# Patient Record
Sex: FEMALE | Race: WHITE | Employment: FULL TIME | URBAN - METROPOLITAN AREA
[De-identification: names, ages, dates, MRNs, and addresses within clinical notes are randomized per-mention and may not be internally consistent; named-entity substitution may affect disease eponyms.]

---

## 2017-01-17 PROBLEM — M18.12 PRIMARY OSTEOARTHRITIS OF FIRST CARPOMETACARPAL JOINT OF LEFT HAND: Status: ACTIVE | Noted: 2017-01-17

## 2017-01-17 PROBLEM — M19.042 DEGENERATIVE ARTHRITIS OF FINGER, LEFT: Status: ACTIVE | Noted: 2017-01-17

## 2017-04-10 PROBLEM — M79.642 LEFT HAND PAIN: Status: ACTIVE | Noted: 2017-04-10

## 2017-05-15 PROBLEM — M72.2 PLANTAR FASCIITIS, RIGHT: Status: ACTIVE | Noted: 2017-05-15

## 2017-05-23 ENCOUNTER — LAB ENCOUNTER (OUTPATIENT)
Dept: LAB | Age: 42
End: 2017-05-23
Attending: ORTHOPAEDIC SURGERY
Payer: COMMERCIAL

## 2017-05-23 DIAGNOSIS — Z01.818 PRE-OP EXAM: Primary | ICD-10-CM

## 2017-05-23 PROCEDURE — 82947 ASSAY GLUCOSE BLOOD QUANT: CPT

## 2017-05-23 PROCEDURE — 84703 CHORIONIC GONADOTROPIN ASSAY: CPT

## 2017-06-06 PROBLEM — Z47.89 ORTHOPEDIC AFTERCARE: Status: ACTIVE | Noted: 2017-06-06

## 2017-09-19 PROBLEM — M65.4 DE QUERVAIN'S TENOSYNOVITIS, LEFT: Status: ACTIVE | Noted: 2017-09-19

## 2018-07-27 PROBLEM — M18.11 ARTHRITIS OF CARPOMETACARPAL (CMC) JOINT OF RIGHT THUMB: Status: ACTIVE | Noted: 2018-07-27

## 2020-09-30 DIAGNOSIS — M54.50 MIDLINE LOW BACK PAIN WITHOUT SCIATICA, UNSPECIFIED CHRONICITY: Primary | ICD-10-CM

## 2020-10-01 ENCOUNTER — APPOINTMENT (OUTPATIENT)
Dept: LAB | Age: 45
End: 2020-10-01
Attending: INTERNAL MEDICINE
Payer: COMMERCIAL

## 2020-10-01 DIAGNOSIS — M54.50 MIDLINE LOW BACK PAIN WITHOUT SCIATICA, UNSPECIFIED CHRONICITY: ICD-10-CM

## 2021-01-15 PROBLEM — M79.644 PAIN OF RIGHT THUMB: Status: ACTIVE | Noted: 2021-01-15

## 2021-08-17 PROBLEM — M65.331 TRIGGER MIDDLE FINGER OF RIGHT HAND: Status: ACTIVE | Noted: 2021-08-17

## 2021-08-17 PROBLEM — M65.4 DE QUERVAIN'S TENOSYNOVITIS, RIGHT: Status: ACTIVE | Noted: 2021-08-17

## 2022-01-04 ENCOUNTER — ORDER TRANSCRIPTION (OUTPATIENT)
Dept: ADMINISTRATIVE | Facility: HOSPITAL | Age: 47
End: 2022-01-04

## 2022-01-04 DIAGNOSIS — Z20.822 CLOSE EXPOSURE TO COVID-19 VIRUS: Primary | ICD-10-CM

## 2022-01-06 ENCOUNTER — LAB ENCOUNTER (OUTPATIENT)
Dept: LAB | Facility: HOSPITAL | Age: 47
End: 2022-01-06
Attending: INTERNAL MEDICINE
Payer: COMMERCIAL

## 2022-01-06 DIAGNOSIS — Z20.822 CLOSE EXPOSURE TO COVID-19 VIRUS: ICD-10-CM

## 2022-01-06 LAB — SARS-COV-2 RNA RESP QL NAA+PROBE: NOT DETECTED

## 2022-06-01 ENCOUNTER — LAB ENCOUNTER (OUTPATIENT)
Dept: LAB | Facility: HOSPITAL | Age: 47
End: 2022-06-01
Attending: HOSPITALIST
Payer: COMMERCIAL

## 2022-06-01 DIAGNOSIS — R09.82 PND (POST-NASAL DRIP): ICD-10-CM

## 2022-06-01 LAB — SARS-COV-2 RNA RESP QL NAA+PROBE: NOT DETECTED

## 2022-08-09 ENCOUNTER — APPOINTMENT (OUTPATIENT)
Dept: URBAN - METROPOLITAN AREA CLINIC 249 | Age: 47
Setting detail: DERMATOLOGY
End: 2022-08-09

## 2022-08-09 DIAGNOSIS — L82.0 INFLAMED SEBORRHEIC KERATOSIS: ICD-10-CM

## 2022-08-09 DIAGNOSIS — M71 OTHER BURSOPATHIES: ICD-10-CM

## 2022-08-09 PROBLEM — M71.341 OTHER BURSAL CYST, RIGHT HAND: Status: ACTIVE | Noted: 2022-08-09

## 2022-08-09 PROBLEM — D48.5 NEOPLASM OF UNCERTAIN BEHAVIOR OF SKIN: Status: ACTIVE | Noted: 2022-08-09

## 2022-08-09 PROCEDURE — 99212 OFFICE O/P EST SF 10 MIN: CPT | Mod: 25

## 2022-08-09 PROCEDURE — A4550 SURGICAL TRAYS: HCPCS

## 2022-08-09 PROCEDURE — 11301 SHAVE SKIN LESION 0.6-1.0 CM: CPT

## 2022-08-09 PROCEDURE — OTHER DEFER: OTHER

## 2022-08-09 PROCEDURE — OTHER SHAVE REMOVAL: OTHER

## 2022-08-09 PROCEDURE — OTHER COUNSELING: OTHER

## 2022-08-09 ASSESSMENT — LOCATION SIMPLE DESCRIPTION DERM
LOCATION SIMPLE: RIGHT INDEX FINGER
LOCATION SIMPLE: ABDOMEN

## 2022-08-09 ASSESSMENT — LOCATION ZONE DERM
LOCATION ZONE: FINGER
LOCATION ZONE: TRUNK

## 2022-08-09 ASSESSMENT — LOCATION DETAILED DESCRIPTION DERM
LOCATION DETAILED: RIGHT INDEX DISTAL INTERPHALANGEAL JOINT
LOCATION DETAILED: RIGHT RIB CAGE

## 2022-08-09 NOTE — PROCEDURE: DEFER
Other Procedure: recommended to see a hand surgeon
Introduction Text (Please End With A Colon): The following procedure was deferred:
Size Of Lesion In Cm (Optional): 0
Reason To Defer Override: referred to hand surgeon
Detail Level: Detailed

## 2022-08-09 NOTE — PROCEDURE: SHAVE REMOVAL
Medical Necessity Clause: This procedure was medically necessary because the lesion that was treated was:
Body Location Override (Optional - Billing Will Still Be Based On Selected Body Map Location If Applicable): right rib cage
Hemostasis: Electrocautery
Add Variable For Additional Medical Justification: No
Bill For Surgical Tray: yes
Biopsy Method: double edge Personna blade
Anesthesia Type: 1% lidocaine with epinephrine
Consent was obtained from the patient. The risks and benefits to therapy were discussed in detail. Specifically, the risks of infection, scarring, bleeding, prolonged wound healing, incomplete removal, allergy to anesthesia, nerve injury and recurrence were addressed. Prior to the procedure, the treatment site was clearly identified and confirmed by the patient. All components of Universal Protocol/PAUSE Rule completed.
Medical Necessity Information: It is in your best interest to select a reason for this procedure from the list below. All of these items fulfill various CMS LCD requirements except the new and changing color options.
Notification Instructions: Patient will be notified of pathology results. However, patient instructed to call the office if not contacted within 2 weeks.
Anesthesia Volume In Cc: 3
Billing Type: Third-Party Bill
Wound Care: Petrolatum
X Size Of Lesion In Cm (Optional): 0
Size Of Lesion In Cm (Required): 0.7
Post-Care Instructions: I reviewed with the patient in detail post-care instructions. Patient is to keep the biopsy site dry overnight, and then apply vaseline or petroleum twice daily until healed. Patient may wash with soap and water
Detail Level: Detailed

## 2022-09-01 ENCOUNTER — LAB REQUISITION (OUTPATIENT)
Dept: LAB | Facility: HOSPITAL | Age: 47
End: 2022-09-01
Payer: COMMERCIAL

## 2022-09-01 DIAGNOSIS — M67.422 GANGLION, LEFT ELBOW: ICD-10-CM

## 2022-09-01 PROCEDURE — 88304 TISSUE EXAM BY PATHOLOGIST: CPT | Performed by: ORTHOPAEDIC SURGERY

## 2022-09-08 ENCOUNTER — EMPLOYEE HEALTH (OUTPATIENT)
Dept: OTHER | Facility: HOSPITAL | Age: 47
End: 2022-09-08
Attending: PREVENTIVE MEDICINE

## 2022-09-08 DIAGNOSIS — Z11.1 SCREENING-PULMONARY TB: Primary | ICD-10-CM

## 2022-09-08 PROCEDURE — 86480 TB TEST CELL IMMUN MEASURE: CPT

## 2022-09-09 LAB
M TB IFN-G CD4+ T-CELLS BLD-ACNC: 0.05 IU/ML
M TB TUBERC IFN-G BLD QL: NEGATIVE
M TB TUBERC IGNF/MITOGEN IGNF CONTROL: >10 IU/ML
QFT TB1 AG MINUS NIL: 0 IU/ML
QFT TB2 AG MINUS NIL: 0.01 IU/ML

## 2022-10-31 ENCOUNTER — IMMUNIZATION (OUTPATIENT)
Dept: LAB | Facility: HOSPITAL | Age: 47
End: 2022-10-31
Attending: PREVENTIVE MEDICINE
Payer: COMMERCIAL

## 2022-10-31 DIAGNOSIS — Z23 NEED FOR VACCINATION: Primary | ICD-10-CM

## 2022-10-31 PROCEDURE — 90471 IMMUNIZATION ADMIN: CPT

## 2023-02-17 ENCOUNTER — LAB REQUISITION (OUTPATIENT)
Dept: LAB | Facility: HOSPITAL | Age: 48
End: 2023-02-17
Payer: COMMERCIAL

## 2023-02-17 DIAGNOSIS — M67.441 GANGLION, RIGHT HAND: ICD-10-CM

## 2023-02-17 PROCEDURE — 88304 TISSUE EXAM BY PATHOLOGIST: CPT | Performed by: ORTHOPAEDIC SURGERY

## 2023-02-28 ENCOUNTER — OFFICE VISIT (OUTPATIENT)
Dept: INTERNAL MEDICINE CLINIC | Facility: CLINIC | Age: 48
End: 2023-02-28
Payer: COMMERCIAL

## 2023-02-28 VITALS
WEIGHT: 202 LBS | TEMPERATURE: 98 F | RESPIRATION RATE: 16 BRPM | BODY MASS INDEX: 39.66 KG/M2 | HEART RATE: 68 BPM | DIASTOLIC BLOOD PRESSURE: 84 MMHG | SYSTOLIC BLOOD PRESSURE: 126 MMHG | HEIGHT: 60 IN | OXYGEN SATURATION: 98 %

## 2023-02-28 DIAGNOSIS — Z12.4 CERVICAL CANCER SCREENING: ICD-10-CM

## 2023-02-28 DIAGNOSIS — Z12.11 COLON CANCER SCREENING: ICD-10-CM

## 2023-02-28 DIAGNOSIS — E11.9 TYPE 2 DIABETES MELLITUS WITHOUT COMPLICATION, WITHOUT LONG-TERM CURRENT USE OF INSULIN (HCC): ICD-10-CM

## 2023-02-28 DIAGNOSIS — Z12.31 ENCOUNTER FOR SCREENING MAMMOGRAM FOR MALIGNANT NEOPLASM OF BREAST: ICD-10-CM

## 2023-02-28 DIAGNOSIS — Z23 NEED FOR PNEUMOCOCCAL VACCINATION: ICD-10-CM

## 2023-02-28 DIAGNOSIS — Z00.00 ANNUAL PHYSICAL EXAM: Primary | ICD-10-CM

## 2023-02-28 DIAGNOSIS — Z00.00 ROUTINE GENERAL MEDICAL EXAMINATION AT A HEALTH CARE FACILITY: ICD-10-CM

## 2023-02-28 PROBLEM — M65.4 DE QUERVAIN'S TENOSYNOVITIS, RIGHT: Status: RESOLVED | Noted: 2021-08-17 | Resolved: 2023-02-28

## 2023-02-28 PROBLEM — M19.042 DEGENERATIVE ARTHRITIS OF FINGER, LEFT: Status: RESOLVED | Noted: 2017-01-17 | Resolved: 2023-02-28

## 2023-02-28 PROBLEM — M79.644 PAIN OF RIGHT THUMB: Status: RESOLVED | Noted: 2021-01-15 | Resolved: 2023-02-28

## 2023-02-28 PROBLEM — M65.4 DE QUERVAIN'S TENOSYNOVITIS, LEFT: Status: RESOLVED | Noted: 2017-09-19 | Resolved: 2023-02-28

## 2023-02-28 PROBLEM — M72.2 PLANTAR FASCIITIS, RIGHT: Status: RESOLVED | Noted: 2017-05-15 | Resolved: 2023-02-28

## 2023-02-28 PROBLEM — E11.69 HYPERLIPIDEMIA ASSOCIATED WITH TYPE 2 DIABETES MELLITUS  (HCC): Status: ACTIVE | Noted: 2023-02-28

## 2023-02-28 PROBLEM — E11.69 HYPERLIPIDEMIA ASSOCIATED WITH TYPE 2 DIABETES MELLITUS (HCC): Status: ACTIVE | Noted: 2023-02-28

## 2023-02-28 PROBLEM — M65.331 TRIGGER MIDDLE FINGER OF RIGHT HAND: Status: RESOLVED | Noted: 2021-08-17 | Resolved: 2023-02-28

## 2023-02-28 PROBLEM — M79.642 LEFT HAND PAIN: Status: RESOLVED | Noted: 2017-04-10 | Resolved: 2023-02-28

## 2023-02-28 PROBLEM — M18.12 PRIMARY OSTEOARTHRITIS OF FIRST CARPOMETACARPAL JOINT OF LEFT HAND: Status: RESOLVED | Noted: 2017-01-17 | Resolved: 2023-02-28

## 2023-02-28 PROBLEM — E78.5 HYPERLIPIDEMIA ASSOCIATED WITH TYPE 2 DIABETES MELLITUS (HCC): Status: ACTIVE | Noted: 2023-02-28

## 2023-02-28 PROBLEM — M18.11 ARTHRITIS OF CARPOMETACARPAL (CMC) JOINT OF RIGHT THUMB: Status: RESOLVED | Noted: 2018-07-27 | Resolved: 2023-02-28

## 2023-02-28 PROBLEM — Z47.89 ORTHOPEDIC AFTERCARE: Status: RESOLVED | Noted: 2017-06-06 | Resolved: 2023-02-28

## 2023-02-28 PROBLEM — E78.5 HYPERLIPIDEMIA ASSOCIATED WITH TYPE 2 DIABETES MELLITUS: Status: ACTIVE | Noted: 2023-02-28

## 2023-02-28 PROBLEM — E66.01 MORBID (SEVERE) OBESITY DUE TO EXCESS CALORIES (HCC): Status: ACTIVE | Noted: 2023-02-28

## 2023-02-28 PROBLEM — E78.5 HYPERLIPIDEMIA ASSOCIATED WITH TYPE 2 DIABETES MELLITUS  (HCC): Status: ACTIVE | Noted: 2023-02-28

## 2023-02-28 PROBLEM — E11.69 HYPERLIPIDEMIA ASSOCIATED WITH TYPE 2 DIABETES MELLITUS: Status: ACTIVE | Noted: 2023-02-28

## 2023-02-28 PROCEDURE — 3074F SYST BP LT 130 MM HG: CPT | Performed by: INTERNAL MEDICINE

## 2023-02-28 PROCEDURE — 90677 PCV20 VACCINE IM: CPT | Performed by: INTERNAL MEDICINE

## 2023-02-28 PROCEDURE — 3008F BODY MASS INDEX DOCD: CPT | Performed by: INTERNAL MEDICINE

## 2023-02-28 PROCEDURE — 3079F DIAST BP 80-89 MM HG: CPT | Performed by: INTERNAL MEDICINE

## 2023-02-28 PROCEDURE — 99386 PREV VISIT NEW AGE 40-64: CPT | Performed by: INTERNAL MEDICINE

## 2023-02-28 PROCEDURE — 90471 IMMUNIZATION ADMIN: CPT | Performed by: INTERNAL MEDICINE

## 2023-02-28 RX ORDER — EXENATIDE 2 MG/.85ML
INJECTION, SUSPENSION, EXTENDED RELEASE SUBCUTANEOUS
COMMUNITY
Start: 2022-11-23

## 2023-02-28 RX ORDER — LANCETS 33 GAUGE
1 EACH MISCELLANEOUS AS DIRECTED
COMMUNITY

## 2023-02-28 RX ORDER — ROSUVASTATIN CALCIUM 20 MG/1
20 TABLET, COATED ORAL DAILY
Qty: 90 TABLET | Refills: 3 | Status: SHIPPED | OUTPATIENT
Start: 2023-02-28

## 2023-02-28 RX ORDER — ROSUVASTATIN CALCIUM 20 MG/1
20 TABLET, COATED ORAL DAILY
Qty: 90 TABLET | Refills: 3 | Status: SHIPPED | OUTPATIENT
Start: 2023-02-28 | End: 2023-02-28

## 2023-02-28 RX ORDER — BLOOD SUGAR DIAGNOSTIC
STRIP MISCELLANEOUS
COMMUNITY

## 2023-02-28 RX ORDER — EMPAGLIFLOZIN 25 MG/1
TABLET, FILM COATED ORAL
COMMUNITY
Start: 2023-01-27

## 2023-03-13 NOTE — TELEPHONE ENCOUNTER
Last time medication was refilled 2/11/21  Quantity and # of refills 135/1  Last OV 2828/23  Next OV 8/22/23

## 2023-03-13 NOTE — TELEPHONE ENCOUNTER
Refill Req:   Patient has 1 week left of     Sertraline HCl 50 MG Oral Tab    Please send to [facility]     Last office visit 2/28/23    Next visit 8/22/23

## 2023-03-15 ENCOUNTER — LAB ENCOUNTER (OUTPATIENT)
Dept: LAB | Age: 48
End: 2023-03-15
Attending: PREVENTIVE MEDICINE
Payer: COMMERCIAL

## 2023-05-01 ENCOUNTER — HOSPITAL ENCOUNTER (OUTPATIENT)
Dept: MAMMOGRAPHY | Facility: HOSPITAL | Age: 48
Discharge: HOME | End: 2023-05-01
Attending: INTERNAL MEDICINE
Payer: COMMERCIAL

## 2023-05-01 ENCOUNTER — OFFICE VISIT (OUTPATIENT)
Facility: CLINIC | Age: 48
End: 2023-05-01
Payer: COMMERCIAL

## 2023-05-01 VITALS
HEART RATE: 90 BPM | SYSTOLIC BLOOD PRESSURE: 126 MMHG | HEIGHT: 60 IN | BODY MASS INDEX: 38.21 KG/M2 | DIASTOLIC BLOOD PRESSURE: 74 MMHG | WEIGHT: 194.63 LBS

## 2023-05-01 DIAGNOSIS — Z12.31 ENCOUNTER FOR SCREENING MAMMOGRAM FOR MALIGNANT NEOPLASM OF BREAST: ICD-10-CM

## 2023-05-01 DIAGNOSIS — Z01.419 ENCOUNTER FOR ANNUAL ROUTINE GYNECOLOGICAL EXAMINATION: Primary | ICD-10-CM

## 2023-05-01 PROCEDURE — 87624 HPV HI-RISK TYP POOLED RSLT: CPT | Performed by: STUDENT IN AN ORGANIZED HEALTH CARE EDUCATION/TRAINING PROGRAM

## 2023-05-01 PROCEDURE — 77063 BREAST TOMOSYNTHESIS BI: CPT | Performed by: INTERNAL MEDICINE

## 2023-05-01 PROCEDURE — 77067 SCR MAMMO BI INCL CAD: CPT | Performed by: INTERNAL MEDICINE

## 2023-05-02 LAB — HPV I/H RISK 1 DNA SPEC QL NAA+PROBE: NEGATIVE

## 2023-05-17 ENCOUNTER — LAB ENCOUNTER (OUTPATIENT)
Dept: LAB | Age: 48
End: 2023-05-17
Attending: INTERNAL MEDICINE
Payer: COMMERCIAL

## 2023-05-17 PROCEDURE — 3046F HEMOGLOBIN A1C LEVEL >9.0%: CPT | Performed by: INTERNAL MEDICINE

## 2023-07-05 ENCOUNTER — OFFICE VISIT (OUTPATIENT)
Facility: CLINIC | Age: 48
End: 2023-07-05
Payer: COMMERCIAL

## 2023-07-05 DIAGNOSIS — E78.5 HYPERLIPIDEMIA ASSOCIATED WITH TYPE 2 DIABETES MELLITUS: Primary | ICD-10-CM

## 2023-07-05 DIAGNOSIS — E11.9 TYPE 2 DIABETES MELLITUS WITHOUT COMPLICATION, WITHOUT LONG-TERM CURRENT USE OF INSULIN (HCC): ICD-10-CM

## 2023-07-05 DIAGNOSIS — E66.01 MORBID (SEVERE) OBESITY DUE TO EXCESS CALORIES (HCC): ICD-10-CM

## 2023-07-05 DIAGNOSIS — G47.33 OBSTRUCTIVE SLEEP APNEA: ICD-10-CM

## 2023-07-05 DIAGNOSIS — E11.69 HYPERLIPIDEMIA ASSOCIATED WITH TYPE 2 DIABETES MELLITUS: Primary | ICD-10-CM

## 2023-07-05 PROCEDURE — 99203 OFFICE O/P NEW LOW 30 MIN: CPT | Performed by: OTHER

## 2023-07-10 ENCOUNTER — TELEPHONE (OUTPATIENT)
Facility: CLINIC | Age: 48
End: 2023-07-10

## 2023-07-10 DIAGNOSIS — E11.9 TYPE 2 DIABETES MELLITUS WITHOUT COMPLICATION, WITHOUT LONG-TERM CURRENT USE OF INSULIN (HCC): ICD-10-CM

## 2023-07-10 DIAGNOSIS — G47.33 OBSTRUCTIVE SLEEP APNEA: Primary | ICD-10-CM

## 2023-07-10 NOTE — TELEPHONE ENCOUNTER
Per Dr. Jonas Crane, please order apap 6-12, needs new supplies. Pt notified cpap machine ordered to [address] [place] [address]. DME will verify insurance and once approved will contact pt to arrange delivery and instructions. Pt instructed to follow up with Dr. Jonas Crane once pt starts PAP therapy per insurance compliance requirement. Pt verbalized understanding of instructions and agrees with the plan.      [phone] (home) [phone] (work)  [address] [place] [address] - [phone]

## 2023-07-21 ENCOUNTER — OFFICE VISIT (OUTPATIENT)
Dept: SLEEP CENTER | Age: 48
End: 2023-07-21
Attending: Other
Payer: COMMERCIAL

## 2023-07-21 DIAGNOSIS — G47.33 OBSTRUCTIVE SLEEP APNEA: ICD-10-CM

## 2023-07-21 DIAGNOSIS — E11.9 TYPE 2 DIABETES MELLITUS WITHOUT COMPLICATION, WITHOUT LONG-TERM CURRENT USE OF INSULIN (HCC): ICD-10-CM

## 2023-07-21 PROCEDURE — 95810 POLYSOM 6/> YRS 4/> PARAM: CPT

## 2023-07-21 PROCEDURE — 95811 POLYSOM 6/>YRS CPAP 4/> PARM: CPT

## 2023-07-24 PROBLEM — Z12.11 SPECIAL SCREENING FOR MALIGNANT NEOPLASM OF COLON: Status: ACTIVE | Noted: 2023-07-24

## 2023-07-24 PROBLEM — D12.8 BENIGN NEOPLASM OF RECTUM AND ANAL CANAL: Status: ACTIVE | Noted: 2023-07-24

## 2023-07-24 PROBLEM — D12.9 BENIGN NEOPLASM OF RECTUM AND ANAL CANAL: Status: ACTIVE | Noted: 2023-07-24

## 2023-07-24 PROBLEM — D12.3 BENIGN NEOPLASM OF TRANSVERSE COLON: Status: ACTIVE | Noted: 2023-07-24

## 2023-07-27 ENCOUNTER — SLEEP STUDY (OUTPATIENT)
Facility: CLINIC | Age: 48
End: 2023-07-27
Payer: COMMERCIAL

## 2023-07-27 DIAGNOSIS — G47.9 SLEEP DISORDER: Primary | ICD-10-CM

## 2023-07-27 DIAGNOSIS — G47.33 OBSTRUCTIVE SLEEP APNEA SYNDROME: ICD-10-CM

## 2023-07-27 PROCEDURE — 95811 POLYSOM 6/>YRS CPAP 4/> PARM: CPT | Performed by: OTHER

## 2023-07-31 ENCOUNTER — TELEPHONE (OUTPATIENT)
Facility: CLINIC | Age: 48
End: 2023-07-31

## 2023-07-31 DIAGNOSIS — G47.33 OSA (OBSTRUCTIVE SLEEP APNEA): Primary | ICD-10-CM

## 2023-08-01 ENCOUNTER — MED REC SCAN ONLY (OUTPATIENT)
Facility: CLINIC | Age: 48
End: 2023-08-01

## 2023-08-21 ENCOUNTER — LAB ENCOUNTER (OUTPATIENT)
Dept: LAB | Age: 48
End: 2023-08-21
Attending: INTERNAL MEDICINE
Payer: COMMERCIAL

## 2023-08-21 ENCOUNTER — PATIENT MESSAGE (OUTPATIENT)
Dept: INTERNAL MEDICINE CLINIC | Facility: CLINIC | Age: 48
End: 2023-08-21

## 2023-08-21 DIAGNOSIS — Z00.00 ROUTINE GENERAL MEDICAL EXAMINATION AT A HEALTH CARE FACILITY: ICD-10-CM

## 2023-08-21 LAB
BILIRUB UR QL STRIP.AUTO: NEGATIVE
CLARITY UR REFRACT.AUTO: CLEAR
COLOR UR AUTO: COLORLESS
CREAT UR-SCNC: 64.5 MG/DL
GLUCOSE UR STRIP.AUTO-MCNC: >1000 MG/DL
KETONES UR STRIP.AUTO-MCNC: NEGATIVE MG/DL
LEUKOCYTE ESTERASE UR QL STRIP.AUTO: NEGATIVE
MICROALBUMIN UR-MCNC: 1.55 MG/DL
MICROALBUMIN/CREAT 24H UR-RTO: 24 UG/MG (ref ?–30)
NITRITE UR QL STRIP.AUTO: NEGATIVE
PH UR STRIP.AUTO: 5 [PH] (ref 5–8)
PROT UR STRIP.AUTO-MCNC: NEGATIVE MG/DL
RBC UR QL AUTO: NEGATIVE
SP GR UR STRIP.AUTO: 1.02 (ref 1–1.03)
UROBILINOGEN UR STRIP.AUTO-MCNC: NORMAL MG/DL

## 2023-08-21 PROCEDURE — 82570 ASSAY OF URINE CREATININE: CPT

## 2023-08-21 PROCEDURE — 81003 URINALYSIS AUTO W/O SCOPE: CPT

## 2023-08-21 PROCEDURE — 82043 UR ALBUMIN QUANTITATIVE: CPT

## 2023-08-21 PROCEDURE — 3061F NEG MICROALBUMINURIA REV: CPT | Performed by: INTERNAL MEDICINE

## 2023-08-21 NOTE — TELEPHONE ENCOUNTER
From: [facility]  To: Bharathi Wetzel MD  Sent: 8/21/2023 9:48 AM CDT  Subject: Labs    Is there any other labs besides the urinalysis that i need to have done before my visit tomorrow? Thank you!

## 2023-08-22 ENCOUNTER — OFFICE VISIT (OUTPATIENT)
Dept: INTERNAL MEDICINE CLINIC | Facility: CLINIC | Age: 48
End: 2023-08-22
Payer: COMMERCIAL

## 2023-08-22 VITALS
WEIGHT: 197 LBS | OXYGEN SATURATION: 98 % | BODY MASS INDEX: 38 KG/M2 | TEMPERATURE: 98 F | SYSTOLIC BLOOD PRESSURE: 102 MMHG | DIASTOLIC BLOOD PRESSURE: 70 MMHG | RESPIRATION RATE: 16 BRPM | HEART RATE: 83 BPM

## 2023-08-22 DIAGNOSIS — E78.2 MIXED HYPERLIPIDEMIA: ICD-10-CM

## 2023-08-22 DIAGNOSIS — E11.9 TYPE 2 DIABETES MELLITUS WITHOUT COMPLICATION, WITHOUT LONG-TERM CURRENT USE OF INSULIN (HCC): Primary | ICD-10-CM

## 2023-08-22 PROBLEM — Z12.11 SPECIAL SCREENING FOR MALIGNANT NEOPLASM OF COLON: Status: RESOLVED | Noted: 2023-07-24 | Resolved: 2023-08-22

## 2023-08-22 PROBLEM — D12.9 BENIGN NEOPLASM OF RECTUM AND ANAL CANAL: Status: RESOLVED | Noted: 2023-07-24 | Resolved: 2023-08-22

## 2023-08-22 PROBLEM — D12.8 BENIGN NEOPLASM OF RECTUM AND ANAL CANAL: Status: RESOLVED | Noted: 2023-07-24 | Resolved: 2023-08-22

## 2023-08-22 PROBLEM — D12.3 BENIGN NEOPLASM OF TRANSVERSE COLON: Status: RESOLVED | Noted: 2023-07-24 | Resolved: 2023-08-22

## 2023-08-22 PROCEDURE — 99214 OFFICE O/P EST MOD 30 MIN: CPT | Performed by: INTERNAL MEDICINE

## 2023-08-22 PROCEDURE — 3078F DIAST BP <80 MM HG: CPT | Performed by: INTERNAL MEDICINE

## 2023-08-22 PROCEDURE — 3074F SYST BP LT 130 MM HG: CPT | Performed by: INTERNAL MEDICINE

## 2023-08-22 RX ORDER — BLOOD-GLUCOSE METER
1 KIT MISCELLANEOUS AS DIRECTED
COMMUNITY
Start: 2023-06-14

## 2023-08-22 RX ORDER — PEN NEEDLE, DIABETIC 32GX 5/32"
NEEDLE, DISPOSABLE MISCELLANEOUS
COMMUNITY
Start: 2023-05-30

## 2023-08-22 RX ORDER — INSULIN GLARGINE AND LIXISENATIDE 100; 33 U/ML; UG/ML
INJECTION, SOLUTION SUBCUTANEOUS
COMMUNITY

## 2023-08-22 RX ORDER — LISINOPRIL 5 MG/1
5 TABLET ORAL DAILY
Qty: 90 TABLET | Refills: 3 | Status: SHIPPED | OUTPATIENT
Start: 2023-08-22

## 2023-10-19 NOTE — TELEPHONE ENCOUNTER
Last time medication was refilled 03/13/2023  Quantity and # of refills 135 w/ 1  Last OV 08/22/2023  Next OV 02/29/2024    Medication not on protocol.        Sent to Dr. Matthew Morton for approval.

## 2023-12-06 ENCOUNTER — LAB ENCOUNTER (OUTPATIENT)
Dept: LAB | Age: 48
End: 2023-12-06
Attending: PREVENTIVE MEDICINE
Payer: COMMERCIAL

## 2023-12-07 ENCOUNTER — TELEPHONE (OUTPATIENT)
Dept: ORTHOPEDICS CLINIC | Facility: CLINIC | Age: 48
End: 2023-12-07

## 2023-12-07 DIAGNOSIS — Z01.89 ENCOUNTER FOR LOWER EXTREMITY COMPARISON IMAGING STUDY: ICD-10-CM

## 2023-12-07 DIAGNOSIS — M25.561 RIGHT KNEE PAIN, UNSPECIFIED CHRONICITY: Primary | ICD-10-CM

## 2023-12-07 NOTE — TELEPHONE ENCOUNTER
An X-ray has been ordered and scheduled in accordance with the provider's protocol for the patient's upcoming appointment.

## 2023-12-13 ENCOUNTER — HOSPITAL ENCOUNTER (OUTPATIENT)
Dept: GENERAL RADIOLOGY | Age: 48
Discharge: HOME | End: 2023-12-13
Attending: ORTHOPAEDIC SURGERY
Payer: COMMERCIAL

## 2023-12-13 ENCOUNTER — OFFICE VISIT (OUTPATIENT)
Dept: ORTHOPEDICS CLINIC | Facility: CLINIC | Age: 48
End: 2023-12-13
Payer: COMMERCIAL

## 2023-12-13 VITALS — WEIGHT: 189 LBS | HEIGHT: 60 IN | BODY MASS INDEX: 37.11 KG/M2

## 2023-12-13 DIAGNOSIS — M17.11 PRIMARY OSTEOARTHRITIS OF RIGHT KNEE: Primary | ICD-10-CM

## 2023-12-13 DIAGNOSIS — Z01.89 ENCOUNTER FOR LOWER EXTREMITY COMPARISON IMAGING STUDY: ICD-10-CM

## 2023-12-13 DIAGNOSIS — M25.561 RIGHT KNEE PAIN, UNSPECIFIED CHRONICITY: ICD-10-CM

## 2023-12-13 PROCEDURE — 73562 X-RAY EXAM OF KNEE 3: CPT | Performed by: ORTHOPAEDIC SURGERY

## 2023-12-13 PROCEDURE — 73564 X-RAY EXAM KNEE 4 OR MORE: CPT | Performed by: ORTHOPAEDIC SURGERY

## 2023-12-13 RX ORDER — TRIAMCINOLONE ACETONIDE 40 MG/ML
40 INJECTION, SUSPENSION INTRA-ARTICULAR; INTRAMUSCULAR ONCE
Status: COMPLETED | OUTPATIENT
Start: 2023-12-13 | End: 2023-12-13

## 2023-12-13 RX ORDER — KETOROLAC TROMETHAMINE 30 MG/ML
30 INJECTION, SOLUTION INTRAMUSCULAR; INTRAVENOUS ONCE
Status: COMPLETED | OUTPATIENT
Start: 2023-12-13 | End: 2023-12-13

## 2023-12-13 RX ADMIN — TRIAMCINOLONE ACETONIDE 40 MG: 40 INJECTION, SUSPENSION INTRA-ARTICULAR; INTRAMUSCULAR at 09:00:00

## 2023-12-13 RX ADMIN — KETOROLAC TROMETHAMINE 30 MG: 30 INJECTION, SOLUTION INTRAMUSCULAR; INTRAVENOUS at 09:00:00

## 2023-12-13 NOTE — PROCEDURES
Right Knee Intra-articular Injection    Name: Wiley Zaman   MRN: [id number]  Date: 12/13/2023     Clinical Indications:   Knee Osteoarthritis with symptoms refractory to conservative measures. After informed consent, the injection site was marked, sterilized with topical chlorhexidine antiseptic, and locally anesthetized with skin refrigerant. The patient was situation in a comfortable position. Using sterile technique: 1 mL of 30mg/mL of Ketorolac, 2 mL of 0.5% Bupivicaine, 2 mL of 1% Lidocaine, and 1 mL of 40 mg/ml Triamcinolone was injected utilizing anterolateral approach with a 22 gauge needle. A band-aid was applied. The patient tolerated the procedure well. Pallavi Stone. Popeye Benedict MD  Knee, Shoulder, & Elbow Surgery / Sports Medicine Specialist  Orthopaedic Surgery  [address]. [email or web address]  t: [phone]  o: [phone]  f: [phone]

## 2024-01-03 ENCOUNTER — LAB ENCOUNTER (OUTPATIENT)
Dept: LAB | Age: 49
End: 2024-01-03
Attending: PREVENTIVE MEDICINE
Payer: COMMERCIAL

## 2024-01-05 ENCOUNTER — PATIENT MESSAGE (OUTPATIENT)
Dept: ORTHOPEDICS CLINIC | Facility: CLINIC | Age: 49
End: 2024-01-05

## 2024-01-05 NOTE — TELEPHONE ENCOUNTER
From: Merna Caceres  To: Shital Caballero  Sent: 1/5/2024 10:34 AM CST  Subject: Referral    Hi Dr. Caballero,    Can you recommend a orthopedic doctor that deals with hands? I currently see Dr Anne out of [place] but he is out of network now. I have been diagnosed with Dupuytren's contracture. I have developed another cyst that i need looked at. Thanks for your help

## 2024-01-25 RX ORDER — LISINOPRIL 5 MG/1
5 TABLET ORAL DAILY
Qty: 90 TABLET | Refills: 0 | Status: SHIPPED | OUTPATIENT
Start: 2024-01-25

## 2024-01-25 RX ORDER — LISINOPRIL 5 MG/1
5 TABLET ORAL DAILY
Qty: 90 TABLET | Refills: 0 | Status: SHIPPED | OUTPATIENT
Start: 2024-01-25 | End: 2024-01-25

## 2024-01-25 NOTE — TELEPHONE ENCOUNTER
Patient comment: My endocrinologist has me on this but i have left several messages for a refill with no response.  Please call in to the [facility] [phone] qty [phone]. Thank you     Last time medication was refilled 08/22/2023  Quantity and # of refills 90 w 3  Last OV 08/22/2023  Next OV   Future Appointments   Date Time Provider Department Center   2/14/2024  1:15 PM Nba Campbell MD [facility]   2/29/2024  4:45 PM River Gandara MD [facility] [address] [address]     Failed protocol.

## 2024-02-14 ENCOUNTER — OFFICE VISIT (OUTPATIENT)
Dept: ORTHOPEDICS CLINIC | Facility: CLINIC | Age: 49
End: 2024-02-14
Payer: COMMERCIAL

## 2024-02-14 VITALS — WEIGHT: 189 LBS | BODY MASS INDEX: 37.11 KG/M2 | HEIGHT: 60 IN

## 2024-02-14 DIAGNOSIS — M65.311 TRIGGER FINGER OF RIGHT THUMB: Primary | ICD-10-CM

## 2024-02-14 PROCEDURE — 20550 NJX 1 TENDON SHEATH/LIGAMENT: CPT | Performed by: ORTHOPAEDIC SURGERY

## 2024-02-14 PROCEDURE — 99213 OFFICE O/P EST LOW 20 MIN: CPT | Performed by: ORTHOPAEDIC SURGERY

## 2024-02-14 RX ORDER — BETAMETHASONE SODIUM PHOSPHATE AND BETAMETHASONE ACETATE 3; 3 MG/ML; MG/ML
6 INJECTION, SUSPENSION INTRA-ARTICULAR; INTRALESIONAL; INTRAMUSCULAR; SOFT TISSUE ONCE
Status: COMPLETED | OUTPATIENT
Start: 2024-02-14 | End: 2024-02-14

## 2024-02-14 RX ADMIN — BETAMETHASONE SODIUM PHOSPHATE AND BETAMETHASONE ACETATE 6 MG: 3; 3 INJECTION, SUSPENSION INTRA-ARTICULAR; INTRALESIONAL; INTRAMUSCULAR; SOFT TISSUE at 14:06:00

## 2024-02-14 NOTE — H&P
Clinic Note     Assessment/Plan:  48 year old female    Right index finger DIP joint cyst - Patient is minimally symptomatic. The etiology of the patient's condition was discussed including its benign nature.  Nonsurgical and surgical treatment options were reviewed with the patient.  Patient elected to proceed with nonsurgical management and observe the cyst. If the cyst grows or becomes painful, we may consider a different surgical procedure than what was done previously and ideally this might prevent it from recurring again.  Right trigger thumb - Actively triggering. We discussed nonsurgical treatment options and elected to proceed with a corticosteroid injection which was performed today.  If triggering does not fully resolved by 6-8 weeks we can consider other options such as an A1 pulley release..Patient is in agreement with plan.      Follow Up: 6-8 weeks    Injection:     Written consent was obtained.  The skin was prepped with alcohol.  Ethyl chloride spray was used anesthetize the superficial skin.  A 25-gauge needle was used to inject 1.5 mL mixture of 1 mL of 6 mg of betamethasone and 1 mL of 1% lidocaine into right thumb.  Hemostasis achieved.  Band-Aid was applied.  Patient tolerated procedure without complication.        Diagnostic Studies:     None       Physical Exam:     Ht 5' (1.524 m)   Wt 189 lb (85.7 kg)   LMP 08/20/2023   BMI 36.91 kg/m²     Constitutional: NAD. AOx3. Well-developed and Well-nourished.   Psychiatric: Normal mood/ affect/ behavior. Judgment and thought content normal.     Right Upper Extremity:     Inspection    Skin intact. No skin lesions. No obvious mass visualized.    Palpation    TTP over A1 pulley.   Active triggering      ROM    Full finger and wrist motion     Neurovascular    Normal sensation in the median, ulnar, and radial nerve distribution. Normal motor function of muscles innervated by median/AIN, ulnar, and radial/PIN nerves.    Normally perfused hand(s).      Special    (+) Active triggering            CC: Right index finger cyst    HPI: This 48 year old RHD female presents with a right index finger cyst.  Started about a year ago.  Patient previously has had 2 removed.  Mild pain. Patient also reports history of dupuytren's.       Occupation: PSR    History/Other:   Past Medical History:   Diagnosis Date    Anxiety state, unspecified     Arthritis 2017    Decorative tattoo 1994    Depressive disorder, not elsewhere classified     Diabetes mellitus (HCC) 1997    Obesity, unspecified     Other and unspecified hyperlipidemia     Pap smear for cervical cancer screening 10/17/2019    Negative/HPV Negative    Type II or unspecified type diabetes mellitus without mention of complication, not stated as uncontrolled     seeing Endo    Unspecified essential hypertension     Unspecified sleep apnea     Wears glasses 1975     Past Surgical History:   Procedure Laterality Date    HAND/FINGER SURGERY UNLISTED Left 3/17/17--Dr. Anne    Thumb MP fusion and ligament reconstruction tendon interposition arthroplasty    HAND/FINGER SURGERY UNLISTED Right 12/30/20--Dr. Adair Anne    thumb ligament reconstruction tendon interposition arthroplasty    KNEE SURGERY  02/20/2015     Current Outpatient Medications   Medication Sig Dispense Refill    lisinopril 5 MG Oral Tab Take 1 tablet (5 mg total) by mouth daily. 90 tablet 0    sertraline 50 MG Oral Tab TAKE ONE AND ONE-HALF TABLETS ONCE DAILY 135 tablet 0    Insulin Glargine-Lixisenatide (SOLIQUA) 100-33 UNT-MCG/ML Subcutaneous Solution Pen-injector Inject into the skin.      BD PEN NEEDLE ENRIQUE U/F 32G X 4 MM Does not apply Misc       Blood Glucose Monitoring Suppl (CONTOUR NEXT GEN MONITOR) w/Device Does not apply Kit Take 1 Bottle by mouth As Directed.      Desonide 0.05 % External Ointment Apply 1 g topically daily. 15 g 1    Glucose Blood (ONETOUCH VERIO) In Vitro Strip OneTouch Verio test strips      OneTouch Delica Lancets 33G  Does not apply Misc 1 Lancet by Finger stick route As Directed.      JARDIANCE 25 MG Oral Tab       rosuvastatin (CRESTOR) 20 MG Oral Tab Take 1 tablet (20 mg total) by mouth daily. 90 tablet 3    hydrocortisone 2.5 % External Cream Apply 1 Application. topically 2 (two) times daily. Apply to face twice daily 30 g 2    ALPRAZolam 0.25 MG Oral Tab Take 1 tablet (0.25 mg total) by mouth nightly as needed for Sleep. 90 tablet 0    MetFORMIN HCl (GLUCOPHAGE) 1000 MG Oral Tab        No Known Allergies  Family History   Problem Relation Age of Onset    Prostate Cancer Father     Heart Disorder Father         MI, stroke    Diabetes Father         unknown    Cancer Father         prostate    Diabetes Mother         unknown    Hypertension Mother         unknown    Lipids Mother         unknown    Cancer Maternal Grandfather         stomach    Diabetes Sister     Diabetes Brother      Social History     Occupational History    Not on file   Tobacco Use    Smoking status: Former     Packs/day: .5     Types: Cigarettes     Quit date: 12/29/2020     Years since quitting: 3.1    Smokeless tobacco: Former    Tobacco comments:     4 cigaretts per day for 10 yrs   Vaping Use    Vaping Use: Never used   Substance and Sexual Activity    Alcohol use: Not Currently    Drug use: No    Sexual activity: Not Currently      Assessment       Review of Systems (negative unless bolded):  General: fevers, chills, fatigue  CV:  chest pain, palpitations, leg swelling  Msk: bodyaches, neck pain, neck stiffness  Skin: rashes, open wounds, nonhealing ulcers  Hem: bleeds easily, bruise easily, immunocompromised  Neuro: dizziness, light headedness, headaches  Psych: anxious, depressed, anger issues    Attention: This note has been scribed by Libra Gallegos under the supervision of Nba Campbell MD.     Nba Campbell MD   Hand, Wrist, & Elbow Surgery  [email or web address]  t: [phone]  f: [phone]

## 2024-03-07 ENCOUNTER — OFFICE VISIT (OUTPATIENT)
Dept: INTERNAL MEDICINE CLINIC | Facility: CLINIC | Age: 49
End: 2024-03-07
Payer: COMMERCIAL

## 2024-03-07 VITALS
BODY MASS INDEX: 38.09 KG/M2 | HEIGHT: 60 IN | OXYGEN SATURATION: 98 % | DIASTOLIC BLOOD PRESSURE: 70 MMHG | SYSTOLIC BLOOD PRESSURE: 128 MMHG | TEMPERATURE: 98 F | RESPIRATION RATE: 14 BRPM | HEART RATE: 80 BPM | WEIGHT: 194 LBS

## 2024-03-07 DIAGNOSIS — E11.9 TYPE 2 DIABETES MELLITUS WITHOUT COMPLICATION, WITHOUT LONG-TERM CURRENT USE OF INSULIN (HCC): ICD-10-CM

## 2024-03-07 DIAGNOSIS — Z00.00 ROUTINE GENERAL MEDICAL EXAMINATION AT A HEALTH CARE FACILITY: ICD-10-CM

## 2024-03-07 DIAGNOSIS — Z12.31 ENCOUNTER FOR SCREENING MAMMOGRAM FOR MALIGNANT NEOPLASM OF BREAST: ICD-10-CM

## 2024-03-07 DIAGNOSIS — Z00.00 ANNUAL PHYSICAL EXAM: Primary | ICD-10-CM

## 2024-03-07 PROCEDURE — 99396 PREV VISIT EST AGE 40-64: CPT | Performed by: INTERNAL MEDICINE

## 2024-03-07 NOTE — PROGRESS NOTES
Subjective:   Patient ID: Merna Caceres is a 48 year old female.    Diabetes      HPI:   Merna Caceres is a 48 year old female who presents for a complete physical exam. Symptoms: denies discharge, itching, burning or dysuria. Patient complains of nothing  Greatly improved accu checks  Denies cp or sob.     PAST MEDICAL, SOCIAL, FAMILY HISTORIES REVIEWED WITH PT    Immunization History   Administered Date(s) Administered    >=3 YRS TRI  MULTIDOSE VIAL (84777) FLU CLINIC 10/09/2023    Covid-19 Vaccine Pfizer 30 mcg/0.3 ml 02/04/2021, 02/26/2021, 12/31/2021    FLU VAC QIV SPLIT 3 YRS AND OLDER (36188) 10/31/2017    FLULAVAL 6 months & older 0.5 ml Prefilled syringe (40970) 10/31/2022    FLUZONE 6 months and older PFS 0.5 ml (32553) 09/22/2018    Influenza 10/27/2016, 10/05/2021    Pneumococcal Conjugate PCV20 02/28/2023    Pneumovax 23 12/31/2014    TDAP 08/04/2009, 08/19/2019   Deferred Date(s) Deferred    Influenza 10/09/2013     Wt Readings from Last 6 Encounters:   03/07/24 194 lb (88 kg)   02/14/24 189 lb (85.7 kg)   12/13/23 189 lb (85.7 kg)   08/22/23 197 lb (89.4 kg)   07/06/23 194 lb (88 kg)   05/01/23 194 lb 9.6 oz (88.3 kg)     Body mass index is 37.89 kg/m².     Lab Results   Component Value Date     (H) 05/17/2023     (H) 11/05/2020     (H) 09/20/2019    GLUCOSE 342 (H) 08/20/2014    GLUCOSE 295 (H) 02/13/2014    GLUCOSE 264 (H) 04/16/2013     Lab Results   Component Value Date    CHOLEST 121 05/17/2023    CHOLEST 198.00 09/20/2019    CHOLEST 201 (H) 08/20/2014     Lab Results   Component Value Date    HDL 47 05/17/2023    HDL 41 (L) 09/20/2019    HDL 49 08/20/2014     Lab Results   Component Value Date    LDL 44 05/17/2023     09/20/2019     (H) 08/20/2014     Lab Results   Component Value Date    AST 23 05/17/2023    AST 12 (L) 09/20/2019    AST 16 03/13/2017     Lab Results   Component Value Date    ALT 33 05/17/2023    ALT 18 09/20/2019    ALT 29 03/13/2017        Current Outpatient Medications   Medication Sig Dispense Refill    lisinopril 5 MG Oral Tab Take 1 tablet (5 mg total) by mouth daily. 90 tablet 0    sertraline 50 MG Oral Tab TAKE ONE AND ONE-HALF TABLETS ONCE DAILY 135 tablet 0    Insulin Glargine-Lixisenatide (SOLIQUA) 100-33 UNT-MCG/ML Subcutaneous Solution Pen-injector Inject into the skin.      BD PEN NEEDLE ENRIQUE U/F 32G X 4 MM Does not apply Misc       Blood Glucose Monitoring Suppl (CONTOUR NEXT GEN MONITOR) w/Device Does not apply Kit Take 1 Bottle by mouth As Directed.      Desonide 0.05 % External Ointment Apply 1 g topically daily. 15 g 1    Glucose Blood (ONETOUCH VERIO) In Vitro Strip OneTouch Verio test strips      OneTouch Delica Lancets 33G Does not apply Misc 1 Lancet by Finger stick route As Directed.      JARDIANCE 25 MG Oral Tab       rosuvastatin (CRESTOR) 20 MG Oral Tab Take 1 tablet (20 mg total) by mouth daily. 90 tablet 3    hydrocortisone 2.5 % External Cream Apply 1 Application. topically 2 (two) times daily. Apply to face twice daily 30 g 2    ALPRAZolam 0.25 MG Oral Tab Take 1 tablet (0.25 mg total) by mouth nightly as needed for Sleep. 90 tablet 0    MetFORMIN HCl (GLUCOPHAGE) 1000 MG Oral Tab         Past Medical History:   Diagnosis Date    Anxiety state, unspecified     Arthritis 2017    Decorative tattoo 1994    Depressive disorder, not elsewhere classified     Diabetes mellitus (HCC) 1997    Obesity, unspecified     Other and unspecified hyperlipidemia     Pap smear for cervical cancer screening 10/17/2019    Negative/HPV Negative    Type II or unspecified type diabetes mellitus without mention of complication, not stated as uncontrolled     seeing Endo    Unspecified essential hypertension     Unspecified sleep apnea     Wears glasses 1975      Past Surgical History:   Procedure Laterality Date    HAND/FINGER SURGERY UNLISTED Left 3/17/17--Dr. Anne    Thumb MP fusion and ligament reconstruction tendon interposition  arthroplasty    HAND/FINGER SURGERY UNLISTED Right 12/30/20--Dr. Adair Anne    thumb ligament reconstruction tendon interposition arthroplasty    KNEE SURGERY  02/20/2015      Family History   Problem Relation Age of Onset    Prostate Cancer Father     Heart Disorder Father         MI, stroke    Diabetes Father         unknown    Cancer Father         prostate    Diabetes Mother         unknown    Hypertension Mother         unknown    Lipids Mother         unknown    Cancer Maternal Grandfather         stomach    Diabetes Sister     Diabetes Brother       Social History:   Social History     Socioeconomic History    Marital status:    Tobacco Use    Smoking status: Former     Packs/day: .5     Types: Cigarettes     Quit date: 12/29/2020     Years since quitting: 3.1    Smokeless tobacco: Former    Tobacco comments:     4 cigaretts per day for 10 yrs   Vaping Use    Vaping Use: Never used   Substance and Sexual Activity    Alcohol use: Not Currently    Drug use: No    Sexual activity: Not Currently   Other Topics Concern    Seat Belt Yes     Occ: yes. : ye. Children: yes.   Exercise: once per week,  twice per week.  Diet: watches fats closely and watches sugar closely     REVIEW OF SYSTEMS:   A comprehensive 10 point review of systems was completed.     Pertinent positives and negatives noted in the HPI.      EXAM:   /70   Pulse 80   Temp 98.1 °F (36.7 °C)   Resp 14   Ht 5' (1.524 m)   Wt 194 lb (88 kg)   LMP 02/18/2024 (Exact Date)   SpO2 98%   BMI 37.89 kg/m²   Body mass index is 37.89 kg/m².   GENERAL: well developed, well nourished,in no apparent distress  SKIN: no rashes,no suspicious lesions  HEENT: atraumatic, normocephalic,ears and throat are clear  EYES:PERRLA, EOMI, conjunctiva are clear  NECK: supple,no adenopathy,no bruits  LUNGS: clear to auscultation  CARDIO: RRR without murmur  GI: good BS's,no masses, HSM or tenderness  :deferred to gyne  MUSCULOSKELETAL: back is not  tender  EXTREMITIES: no cyanosis, clubbing or edema  NEURO: Oriented times three,motor and sensory are grossly intact, Bilateral barefoot skin diabetic exam is normal, visualized feet and the appearance is normal.  Bilateral monofilament/sensation of both feet is normal.  Pulsation pedal pulse exam of both lower legs/feet is normal as well.        ASSESSMENT AND PLAN:   Merna Caceres is a 48 year old female who presents for a complete physical exam. Pap and pelvic done by gyne. Order put in for mammogram    Health maintenance, will check fasting Lipids, CMP, and CBC and A1c.  UTD with screening colonoscopy.   Cont current meds  Pt info handouts given for: exercise, low fat diet Body mass index is 37.89 kg/m²., recommended low fat diet and aerobic exercise 30 minutes three times weekly.    The patient indicates understanding of these issues and agrees to the plan.  The patient is asked to return for CPX in 12 m.    History/Other:   Review of Systems  Current Outpatient Medications   Medication Sig Dispense Refill    lisinopril 5 MG Oral Tab Take 1 tablet (5 mg total) by mouth daily. 90 tablet 0    sertraline 50 MG Oral Tab TAKE ONE AND ONE-HALF TABLETS ONCE DAILY 135 tablet 0    Insulin Glargine-Lixisenatide (SOLIQUA) 100-33 UNT-MCG/ML Subcutaneous Solution Pen-injector Inject into the skin.      BD PEN NEEDLE ENRIQUE U/F 32G X 4 MM Does not apply Misc       Blood Glucose Monitoring Suppl (CONTOUR NEXT GEN MONITOR) w/Device Does not apply Kit Take 1 Bottle by mouth As Directed.      Desonide 0.05 % External Ointment Apply 1 g topically daily. 15 g 1    Glucose Blood (ONETOUCH VERIO) In Vitro Strip OneTouch Verio test strips      OneTouch Delica Lancets 33G Does not apply Misc 1 Lancet by Finger stick route As Directed.      JARDIANCE 25 MG Oral Tab       rosuvastatin (CRESTOR) 20 MG Oral Tab Take 1 tablet (20 mg total) by mouth daily. 90 tablet 3    hydrocortisone 2.5 % External Cream Apply 1 Application. topically 2  (two) times daily. Apply to face twice daily 30 g 2    ALPRAZolam 0.25 MG Oral Tab Take 1 tablet (0.25 mg total) by mouth nightly as needed for Sleep. 90 tablet 0    MetFORMIN HCl (GLUCOPHAGE) 1000 MG Oral Tab        Allergies:No Known Allergies    Objective:   Physical Exam    Assessment & Plan:   1. Annual physical exam    2. Routine general medical examination at a health care facility    3. Encounter for screening mammogram for malignant neoplasm of breast    4. Type 2 diabetes mellitus without complication, without long-term current use of insulin (HCC)        Orders Placed This Encounter   Procedures    CBC With Differential With Platelet    Comp Metabolic Panel (14)    Hemoglobin A1C    Microalb/Creat Ratio, Random Urine    Lipid Panel    TSH W Reflex To Free T4    Urinalysis, Routine       Meds This Visit:  Requested Prescriptions      No prescriptions requested or ordered in this encounter       Imaging & Referrals:  [facility] LUCIO 2D+3D SCREENING BILAT (CPT=[id number])

## 2024-03-08 ENCOUNTER — LAB ENCOUNTER (OUTPATIENT)
Dept: LAB | Age: 49
End: 2024-03-08
Attending: INTERNAL MEDICINE
Payer: COMMERCIAL

## 2024-03-08 DIAGNOSIS — Z00.00 ROUTINE GENERAL MEDICAL EXAMINATION AT A HEALTH CARE FACILITY: ICD-10-CM

## 2024-03-08 DIAGNOSIS — E83.52 SERUM CALCIUM ELEVATED: Primary | ICD-10-CM

## 2024-03-08 LAB
ALBUMIN SERPL-MCNC: 3.8 G/DL (ref 3.4–5)
ALBUMIN/GLOB SERPL: 0.9 {RATIO} (ref 1–2)
ALP LIVER SERPL-CCNC: 101 U/L
ALT SERPL-CCNC: 21 U/L
ANION GAP SERPL CALC-SCNC: 2 MMOL/L (ref 0–18)
AST SERPL-CCNC: 12 U/L (ref 15–37)
BASOPHILS # BLD AUTO: 0.02 X10(3) UL (ref 0–0.2)
BASOPHILS NFR BLD AUTO: 0.3 %
BILIRUB SERPL-MCNC: 0.4 MG/DL (ref 0.1–2)
BILIRUB UR QL STRIP.AUTO: NEGATIVE
BUN BLD-MCNC: 23 MG/DL (ref 9–23)
CALCIUM BLD-MCNC: 10.3 MG/DL (ref 8.5–10.1)
CHLORIDE SERPL-SCNC: 106 MMOL/L (ref 98–112)
CHOLEST SERPL-MCNC: 116 MG/DL (ref ?–200)
CLARITY UR REFRACT.AUTO: CLEAR
CO2 SERPL-SCNC: 26 MMOL/L (ref 21–32)
COLOR UR AUTO: COLORLESS
CREAT BLD-MCNC: 1.1 MG/DL
CREAT UR-SCNC: 33.7 MG/DL
EGFRCR SERPLBLD CKD-EPI 2021: 61 ML/MIN/1.73M2 (ref 60–?)
EOSINOPHIL # BLD AUTO: 0.26 X10(3) UL (ref 0–0.7)
EOSINOPHIL NFR BLD AUTO: 4.3 %
ERYTHROCYTE [DISTWIDTH] IN BLOOD BY AUTOMATED COUNT: 13.4 %
EST. AVERAGE GLUCOSE BLD GHB EST-MCNC: 220 MG/DL (ref 68–126)
FASTING PATIENT LIPID ANSWER: NO
FASTING STATUS PATIENT QL REPORTED: NO
GLOBULIN PLAS-MCNC: 4.3 G/DL (ref 2.8–4.4)
GLUCOSE BLD-MCNC: 183 MG/DL (ref 70–99)
GLUCOSE UR STRIP.AUTO-MCNC: >1000 MG/DL
HBA1C MFR BLD: 9.3 % (ref ?–5.7)
HCT VFR BLD AUTO: 37.4 %
HDLC SERPL-MCNC: 49 MG/DL (ref 40–59)
HGB BLD-MCNC: 12.2 G/DL
IMM GRANULOCYTES # BLD AUTO: 0.01 X10(3) UL (ref 0–1)
IMM GRANULOCYTES NFR BLD: 0.2 %
KETONES UR STRIP.AUTO-MCNC: NEGATIVE MG/DL
LDLC SERPL CALC-MCNC: 42 MG/DL (ref ?–100)
LEUKOCYTE ESTERASE UR QL STRIP.AUTO: NEGATIVE
LYMPHOCYTES # BLD AUTO: 1.43 X10(3) UL (ref 1–4)
LYMPHOCYTES NFR BLD AUTO: 23.8 %
MCH RBC QN AUTO: 27.5 PG (ref 26–34)
MCHC RBC AUTO-ENTMCNC: 32.6 G/DL (ref 31–37)
MCV RBC AUTO: 84.4 FL
MICROALBUMIN UR-MCNC: 2.14 MG/DL
MICROALBUMIN/CREAT 24H UR-RTO: 63.5 UG/MG (ref ?–30)
MONOCYTES # BLD AUTO: 0.38 X10(3) UL (ref 0.1–1)
MONOCYTES NFR BLD AUTO: 6.3 %
NEUTROPHILS # BLD AUTO: 3.91 X10 (3) UL (ref 1.5–7.7)
NEUTROPHILS # BLD AUTO: 3.91 X10(3) UL (ref 1.5–7.7)
NEUTROPHILS NFR BLD AUTO: 65.1 %
NITRITE UR QL STRIP.AUTO: NEGATIVE
NONHDLC SERPL-MCNC: 67 MG/DL (ref ?–130)
OSMOLALITY SERPL CALC.SUM OF ELEC: 286 MOSM/KG (ref 275–295)
PH UR STRIP.AUTO: 5 [PH] (ref 5–8)
PLATELET # BLD AUTO: 234 10(3)UL (ref 150–450)
POTASSIUM SERPL-SCNC: 4.6 MMOL/L (ref 3.5–5.1)
PROT SERPL-MCNC: 8.1 G/DL (ref 6.4–8.2)
PROT UR STRIP.AUTO-MCNC: NEGATIVE MG/DL
RBC # BLD AUTO: 4.43 X10(6)UL
SODIUM SERPL-SCNC: 134 MMOL/L (ref 136–145)
SP GR UR STRIP.AUTO: 1.01 (ref 1–1.03)
TRIGL SERPL-MCNC: 147 MG/DL (ref 30–149)
TSI SER-ACNC: 1.62 MIU/ML (ref 0.36–3.74)
UROBILINOGEN UR STRIP.AUTO-MCNC: NORMAL MG/DL
VLDLC SERPL CALC-MCNC: 20 MG/DL (ref 0–30)
WBC # BLD AUTO: 6 X10(3) UL (ref 4–11)

## 2024-03-08 PROCEDURE — 80053 COMPREHEN METABOLIC PANEL: CPT

## 2024-03-08 PROCEDURE — 82570 ASSAY OF URINE CREATININE: CPT

## 2024-03-08 PROCEDURE — 85025 COMPLETE CBC W/AUTO DIFF WBC: CPT

## 2024-03-08 PROCEDURE — 81001 URINALYSIS AUTO W/SCOPE: CPT

## 2024-03-08 PROCEDURE — 80061 LIPID PANEL: CPT

## 2024-03-08 PROCEDURE — 82043 UR ALBUMIN QUANTITATIVE: CPT

## 2024-03-08 PROCEDURE — 36415 COLL VENOUS BLD VENIPUNCTURE: CPT

## 2024-03-08 PROCEDURE — 83036 HEMOGLOBIN GLYCOSYLATED A1C: CPT

## 2024-03-08 PROCEDURE — 84443 ASSAY THYROID STIM HORMONE: CPT

## 2024-03-08 NOTE — PROGRESS NOTES
Spoke to pt. Made aware of results & recommendations. Pt voiced understanding.  Pt denied s&s of UTI, pt is on her menses currently  Pt will follow up with Endo, repeat calcium lab ordered

## 2024-03-19 ENCOUNTER — MED REC SCAN ONLY (OUTPATIENT)
Dept: INTERNAL MEDICINE CLINIC | Facility: CLINIC | Age: 49
End: 2024-03-19

## 2024-03-19 ENCOUNTER — TELEPHONE (OUTPATIENT)
Dept: INTERNAL MEDICINE CLINIC | Facility: CLINIC | Age: 49
End: 2024-03-19

## 2024-03-19 NOTE — TELEPHONE ENCOUNTER
Called and left detailed message to discard the letter since we can not provide form for SHANTELL, pt to contact her psych provider to get form completed

## 2024-03-21 ENCOUNTER — TELEPHONE (OUTPATIENT)
Dept: INTERNAL MEDICINE CLINIC | Facility: CLINIC | Age: 49
End: 2024-03-21

## 2024-03-21 NOTE — TELEPHONE ENCOUNTER
Summer from [employer] wants to speak with a nurse about a letter of authenticity   PH: [phone]   Clarification of a vm and letters

## 2024-03-21 NOTE — TELEPHONE ENCOUNTER
Spoke with representative  Notified we have left detailed message regarding with drawing the letter and pt was also made aware  Requested to send it in writing via fax, fax document edited and sent back to provided number

## 2024-03-22 DIAGNOSIS — E11.9 TYPE 2 DIABETES MELLITUS WITHOUT COMPLICATION, WITHOUT LONG-TERM CURRENT USE OF INSULIN (HCC): ICD-10-CM

## 2024-03-22 RX ORDER — EMPAGLIFLOZIN 25 MG/1
25 TABLET, FILM COATED ORAL
Qty: 90 TABLET | Refills: 0 | Status: SHIPPED | OUTPATIENT
Start: 2024-03-22

## 2024-03-22 RX ORDER — ROSUVASTATIN CALCIUM 20 MG/1
20 TABLET, COATED ORAL DAILY
Qty: 90 TABLET | Refills: 0 | Status: SHIPPED | OUTPATIENT
Start: 2024-03-22

## 2024-03-22 NOTE — TELEPHONE ENCOUNTER
Jardiance 25 MG  Last time medication was refilled   Last OV 03/07/2024  Next OV due/scheduled   Future Appointments   Date Time Provider Department Center   9/12/2024  9:00 AM River Gandara MD [facility]   Called and spoke to pt, pt states Nichol Bell will manage Jardiance.    Medication failed protocol.  Sent to Dr. Gandara for approval.         Rosuvastatin 20 MG  Last time medication was refilled 02/28/2023  Last OV 03/07/2024  Next OV due/scheduled   Future Appointments   Date Time Provider Department Center   9/12/2024  9:00 AM River Gandara MD [facility]   Passed protocol, Rx sent.

## 2024-04-08 ENCOUNTER — PATIENT MESSAGE (OUTPATIENT)
Dept: ORTHOPEDICS CLINIC | Facility: CLINIC | Age: 49
End: 2024-04-08

## 2024-04-08 DIAGNOSIS — E11.9 TYPE 2 DIABETES MELLITUS WITHOUT COMPLICATION, WITHOUT LONG-TERM CURRENT USE OF INSULIN (HCC): Primary | ICD-10-CM

## 2024-04-08 RX ORDER — LISINOPRIL 5 MG/1
5 TABLET ORAL DAILY
Qty: 90 TABLET | Refills: 0 | Status: SHIPPED | OUTPATIENT
Start: 2024-04-08

## 2024-04-08 NOTE — TELEPHONE ENCOUNTER
From: Merna Caceres  To: Nba Campbell  Sent: 4/8/2024 3:37 PM CDT  Subject: Cyst    Hi Dr. Campbell    I just want to send you a picture of the cyst on my finger. To me it looks like its getting bigger. Also, my right thumb is starting to lock up again. Any advice?

## 2024-04-18 ENCOUNTER — MED REC SCAN ONLY (OUTPATIENT)
Facility: CLINIC | Age: 49
End: 2024-04-18

## 2024-05-03 RX ORDER — INSULIN GLARGINE AND LIXISENATIDE 100; 33 U/ML; UG/ML
15 INJECTION, SOLUTION SUBCUTANEOUS
Qty: 15 ML | Refills: 0 | OUTPATIENT
Start: 2024-05-03

## 2024-05-03 RX ORDER — PEN NEEDLE, DIABETIC 32GX 5/32"
NEEDLE, DISPOSABLE MISCELLANEOUS
Qty: 100 EACH | Refills: 0 | OUTPATIENT
Start: 2024-05-03

## 2024-05-03 NOTE — TELEPHONE ENCOUNTER
Sertraline 50 MG  Last time medication was refilled 10/19/2023  Last OV 03/07/2024  Next OV due/scheduled   Future Appointments   Date Time Provider Department Center   5/6/2024  4:40 PM [facility]   6/20/2024  8:00 AM Nba Campbell MD [id number]   9/12/2024  9:00 AM River Gandara MD [facility]   Medication not on protocol.          [place]   Per Pt to decline medication will send request to   Kyrie Mercer MD

## 2024-05-06 ENCOUNTER — HOSPITAL ENCOUNTER (OUTPATIENT)
Dept: MAMMOGRAPHY | Facility: HOSPITAL | Age: 49
Discharge: HOME | End: 2024-05-06
Attending: INTERNAL MEDICINE
Payer: COMMERCIAL

## 2024-05-06 DIAGNOSIS — Z12.31 ENCOUNTER FOR SCREENING MAMMOGRAM FOR MALIGNANT NEOPLASM OF BREAST: ICD-10-CM

## 2024-05-06 PROCEDURE — 77067 SCR MAMMO BI INCL CAD: CPT | Performed by: INTERNAL MEDICINE

## 2024-05-06 PROCEDURE — 77063 BREAST TOMOSYNTHESIS BI: CPT | Performed by: INTERNAL MEDICINE

## 2024-05-17 ENCOUNTER — HOSPITAL ENCOUNTER (OUTPATIENT)
Dept: MAMMOGRAPHY | Facility: HOSPITAL | Age: 49
Discharge: HOME | End: 2024-05-17
Attending: INTERNAL MEDICINE

## 2024-05-17 DIAGNOSIS — R92.2 INCONCLUSIVE MAMMOGRAM: ICD-10-CM

## 2024-05-17 PROCEDURE — 77061 BREAST TOMOSYNTHESIS UNI: CPT | Performed by: INTERNAL MEDICINE

## 2024-05-17 PROCEDURE — 77065 DX MAMMO INCL CAD UNI: CPT | Performed by: INTERNAL MEDICINE

## 2024-05-20 RX ORDER — EMPAGLIFLOZIN 25 MG/1
25 TABLET, FILM COATED ORAL
Qty: 90 TABLET | Refills: 0 | Status: SHIPPED | OUTPATIENT
Start: 2024-05-20

## 2024-05-20 NOTE — TELEPHONE ENCOUNTER
Last time medication was refilled 03/22/2024  Last OV 03/07/2024  Next OV due/scheduled   Future Appointments   Date Time Provider Department Center   6/20/2024  8:00 AM [id number]   9/12/2024  9:00 AM River Gandara MD [facility]       Failed protocol.     Sent to Doctor Nichol for approval

## 2024-06-24 DIAGNOSIS — E11.9 TYPE 2 DIABETES MELLITUS WITHOUT COMPLICATION, WITHOUT LONG-TERM CURRENT USE OF INSULIN (HCC): ICD-10-CM

## 2024-06-24 RX ORDER — ROSUVASTATIN CALCIUM 20 MG/1
20 TABLET, COATED ORAL DAILY
Qty: 90 TABLET | Refills: 0 | Status: SHIPPED | OUTPATIENT
Start: 2024-06-24

## 2024-06-24 NOTE — TELEPHONE ENCOUNTER
Last time medication was refilled 03/22/2024  Last office visit  03/07/2024  Next office visit due/scheduled   Future Appointments   Date Time Provider Department Center   9/12/2024  9:00 AM River Gandara MD [facility]       Passed protocol, Medication sent.

## 2024-07-11 DIAGNOSIS — E11.9 TYPE 2 DIABETES MELLITUS WITHOUT COMPLICATION, WITHOUT LONG-TERM CURRENT USE OF INSULIN (HCC): ICD-10-CM

## 2024-07-11 RX ORDER — LISINOPRIL 5 MG/1
5 TABLET ORAL DAILY
Qty: 90 TABLET | Refills: 0 | Status: SHIPPED | OUTPATIENT
Start: 2024-07-11

## 2024-07-11 NOTE — TELEPHONE ENCOUNTER
Last time medication was refilled 04/08/2024  Last office visit  03/07/2024  Next office visit due/scheduled   Future Appointments   Date Time Provider Department Center   9/12/2024  9:00 AM River Gandara MD [facility]   10/28/2024  4:30 PM Katja Benedict MD [facility] [place]     Medication passed protocol, refill sent.

## 2024-07-16 ENCOUNTER — PATIENT MESSAGE (OUTPATIENT)
Facility: CLINIC | Age: 49
End: 2024-07-16

## 2024-07-16 NOTE — TELEPHONE ENCOUNTER
From: Merna Caceres  To: Fabio Gaspar  Sent: 7/16/2024 2:10 PM CDT  Subject: Ahi    Hi Dr Gaspar.    I was wondering if you can tell me what my AHI was? Thanks much!

## 2024-09-16 PROBLEM — E11.9 TYPE 2 DIABETES MELLITUS WITHOUT COMPLICATION, WITHOUT LONG-TERM CURRENT USE OF INSULIN (HCC): Status: RESOLVED | Noted: 2023-02-28 | Resolved: 2024-09-16

## 2024-09-16 NOTE — PROGRESS NOTES
Subjective:   Patient ID: Merna Caceres is a 48 year old female.    HPI  HPI:   Merna Caceres is a 48 year old female who presents for recheck of her diabetes and HLD. Patient’s FBS have been at goal..  Pt has been checking her feet on a regular basis. Pt denies any tingling of the feet. Pt denies any issues with depression. Pt complains of nothing  She denies hypoglycemia. She dies chest pain or sob.    Wt Readings from Last 6 Encounters:   09/17/24 188 lb (85.3 kg)   03/07/24 194 lb (88 kg)   02/14/24 189 lb (85.7 kg)   12/13/23 189 lb (85.7 kg)   08/22/23 197 lb (89.4 kg)   07/06/23 194 lb (88 kg)     Body mass index is 36.72 kg/m².     Lab Results   Component Value Date    A1C 9.3 (H) 03/08/2024    A1C 11.4 (H) 05/17/2023    A1C 9.0 (A) 07/06/2020     Lab Results   Component Value Date    CHOLEST 116 03/08/2024    CHOLEST 121 05/17/2023    CHOLEST 198.00 09/20/2019     Lab Results   Component Value Date    HDL 49 03/08/2024    HDL 47 05/17/2023    HDL 41 (L) 09/20/2019     Lab Results   Component Value Date    LDL 42 03/08/2024    LDL 44 05/17/2023     09/20/2019     Lab Results   Component Value Date    TRIG 147 03/08/2024    TRIG 185 (H) 05/17/2023    TRIG 145.00 09/20/2019    TRIGLY 176 (H) 08/20/2014    TRIGLY 228 (H) 02/13/2014    TRIGLY 137 04/16/2013     Lab Results   Component Value Date    AST 12 (L) 03/08/2024    AST 23 05/17/2023    AST 12 (L) 09/20/2019     Lab Results   Component Value Date    ALT 21 03/08/2024    ALT 33 05/17/2023    ALT 18 09/20/2019     Microalb/Creat Ratio   Date Value Ref Range Status   12/04/2007 8.5 0.0 - 30.0 ug/mg creat Final   12/09/2006 3.1 0.0 - 30.0 ug/mg creat Final     MICROALB/CREAT RATIO   Date Value Ref Range Status   07/29/2009 2.9 0.0 - 30.0 mg/g creat Final     Malb/Cre Calc Ratio   Date Value Ref Range Status   08/19/2019 16.8 0.0 - 29.0 ug/mg Final     Malb/Cre Calc   Date Value Ref Range Status   03/08/2024 63.5 (H) <=30.0 ug/mg Final      Comment:     <30 ug/mg creatinine       Normal     ug/mg creatinine   Microalbuminuria   >300 ug/mg creatinine      Albuminuria       08/21/2023 24.0 <=30.0 ug/mg Final     Comment:     <30 ug/mg creatinine       Normal     ug/mg creatinine   Microalbuminuria   >300 ug/mg creatinine      Albuminuria           Current Outpatient Medications   Medication Sig Dispense Refill    sertraline 50 MG Oral Tab Take 1 tablet (50 mg total) by mouth daily. 90 tablet 1    rosuvastatin (CRESTOR) 20 MG Oral Tab Take 1 tablet (20 mg total) by mouth daily. 90 tablet 3    lisinopril 5 MG Oral Tab Take 1 tablet (5 mg total) by mouth daily. 90 tablet 1    JARDIANCE 25 MG Oral Tab Take 25 mg by mouth every morning before breakfast. 90 tablet 0    Insulin Glargine-Lixisenatide (SOLIQUA) 100-33 UNT-MCG/ML Subcutaneous Solution Pen-injector Inject into the skin.      BD PEN NEEDLE ENRIQUE U/F 32G X 4 MM Does not apply Misc       Desonide 0.05 % External Ointment Apply 1 g topically daily. 15 g 1    OneTouch Delica Lancets 33G Does not apply Misc 1 Lancet by Finger stick route As Directed.      hydrocortisone 2.5 % External Cream Apply 1 Application. topically 2 (two) times daily. Apply to face twice daily 30 g 2    ALPRAZolam 0.25 MG Oral Tab Take 1 tablet (0.25 mg total) by mouth nightly as needed for Sleep. 90 tablet 0    MetFORMIN HCl (GLUCOPHAGE) 1000 MG Oral Tab Take 1 tablet (1,000 mg total) by mouth daily with breakfast.        Past Medical History:    Anxiety state, unspecified    Arthritis    Decorative tattoo    Depressive disorder, not elsewhere classified    Diabetes mellitus (HCC)    Obesity, unspecified    Other and unspecified hyperlipidemia    Pap smear for cervical cancer screening    Negative/HPV Negative    Type II or unspecified type diabetes mellitus without mention of complication, not stated as uncontrolled    seeing Endo    Unspecified essential hypertension    Unspecified sleep apnea    Wears glasses       Past Surgical History:   Procedure Laterality Date    Hand/finger surgery unlisted Left 3/17/17--Dr. Anne    Thumb MP fusion and ligament reconstruction tendon interposition arthroplasty    Hand/finger surgery unlisted Right 12/30/20--Dr. Adair Anne    thumb ligament reconstruction tendon interposition arthroplasty    Knee surgery  02/20/2015      Social History:   Social History     Socioeconomic History    Marital status:    Tobacco Use    Smoking status: Former     Current packs/day: 0.00     Types: Cigarettes     Quit date: 12/29/2020     Years since quitting: 3.7    Smokeless tobacco: Former    Tobacco comments:     4 cigaretts per day for 10 yrs   Vaping Use    Vaping status: Never Used   Substance and Sexual Activity    Alcohol use: Not Currently    Drug use: No    Sexual activity: Not Currently   Other Topics Concern    Seat Belt Yes     Exercise: minimal.  Diet: watches minimally     REVIEW OF SYSTEMS:   GENERAL HEALTH: feels well otherwise  SKIN: denies any unusual skin lesions or rashes  RESPIRATORY: denies shortness of breath with exertion  CARDIOVASCULAR: denies chest pain on exertion  GI: denies abdominal pain and denies heartburn  NEURO: denies headaches    EXAM:   /82   Pulse 78   Temp 97.9 °F (36.6 °C)   Resp 16   Ht 5' (1.524 m)   Wt 188 lb (85.3 kg)   LMP 09/03/2024 (Exact Date)   SpO2 98%   BMI 36.72 kg/m²   GENERAL: well developed, well nourished,in no apparent distress  SKIN: no rashes,no suspicious lesions  NECK: supple,no adenopathy,no bruits  LUNGS: clear to auscultation  CARDIO: RRR without murmur  GI: good BS's,no masses, HSM or tenderness  EXTREMITIES: no cyanosis, clubbing or edema  NEURO: Bilateral barefoot skin diabetic exam is normal, visualized feet and the appearance is normal.  Bilateral monofilament/sensation of both feet is normal.  Pulsation pedal pulse exam of both lower legs/feet is normal as well.        ASSESSMENT AND PLAN:   Merna Caceres is a  48 year old female who presents for a recheck of her diabetes and HLD. Diabetic control is stable.  Hyperlipidemia-controlled. Cont current med therapy.  Recommendations are: continue present meds, check HgbA1C, fasting lipids and CMP, lose wgt with carbohydrate controlled diet and exercise, , check feet daily.  The patient indicates understanding of these issues and agrees to the plan.  The patient is asked to return in 6 m.    History/Other:   Review of Systems  Current Outpatient Medications   Medication Sig Dispense Refill    sertraline 50 MG Oral Tab Take 1 tablet (50 mg total) by mouth daily. 90 tablet 1    rosuvastatin (CRESTOR) 20 MG Oral Tab Take 1 tablet (20 mg total) by mouth daily. 90 tablet 3    lisinopril 5 MG Oral Tab Take 1 tablet (5 mg total) by mouth daily. 90 tablet 1    JARDIANCE 25 MG Oral Tab Take 25 mg by mouth every morning before breakfast. 90 tablet 0    Insulin Glargine-Lixisenatide (SOLIQUA) 100-33 UNT-MCG/ML Subcutaneous Solution Pen-injector Inject into the skin.      BD PEN NEEDLE ENRIQUE U/F 32G X 4 MM Does not apply Misc       Desonide 0.05 % External Ointment Apply 1 g topically daily. 15 g 1    OneTouch Delica Lancets 33G Does not apply Misc 1 Lancet by Finger stick route As Directed.      hydrocortisone 2.5 % External Cream Apply 1 Application. topically 2 (two) times daily. Apply to face twice daily 30 g 2    ALPRAZolam 0.25 MG Oral Tab Take 1 tablet (0.25 mg total) by mouth nightly as needed for Sleep. 90 tablet 0    MetFORMIN HCl (GLUCOPHAGE) 1000 MG Oral Tab Take 1 tablet (1,000 mg total) by mouth daily with breakfast.       Allergies:No Known Allergies    Objective:   Physical Exam    Assessment & Plan:   1. Diabetes mellitus with complication (HCC)    2. Mixed hyperlipidemia        Orders Placed This Encounter   Procedures    Comp Metabolic Panel (14)    Hemoglobin A1C    Microalb/Creat Ratio, Random Urine    Lipid Panel       Meds This Visit:  Requested Prescriptions      Signed Prescriptions Disp Refills    sertraline 50 MG Oral Tab 90 tablet 1     Sig: Take 1 tablet (50 mg total) by mouth daily.    rosuvastatin (CRESTOR) 20 MG Oral Tab 90 tablet 3     Sig: Take 1 tablet (20 mg total) by mouth daily.    lisinopril 5 MG Oral Tab 90 tablet 1     Sig: Take 1 tablet (5 mg total) by mouth daily.       Imaging & Referrals:  OPHTHALMOLOGY - INTERNAL

## 2024-09-17 ENCOUNTER — OFFICE VISIT (OUTPATIENT)
Dept: INTERNAL MEDICINE CLINIC | Facility: CLINIC | Age: 49
End: 2024-09-17
Payer: COMMERCIAL

## 2024-09-17 VITALS
HEART RATE: 78 BPM | BODY MASS INDEX: 36.91 KG/M2 | HEIGHT: 60 IN | RESPIRATION RATE: 16 BRPM | SYSTOLIC BLOOD PRESSURE: 122 MMHG | DIASTOLIC BLOOD PRESSURE: 82 MMHG | OXYGEN SATURATION: 98 % | TEMPERATURE: 98 F | WEIGHT: 188 LBS

## 2024-09-17 DIAGNOSIS — E78.2 MIXED HYPERLIPIDEMIA: ICD-10-CM

## 2024-09-17 DIAGNOSIS — E11.8 DIABETES MELLITUS WITH COMPLICATION (HCC): Primary | ICD-10-CM

## 2024-09-17 PROCEDURE — 99214 OFFICE O/P EST MOD 30 MIN: CPT | Performed by: INTERNAL MEDICINE

## 2024-09-17 RX ORDER — ROSUVASTATIN CALCIUM 20 MG/1
20 TABLET, COATED ORAL DAILY
Qty: 90 TABLET | Refills: 3 | Status: SHIPPED | OUTPATIENT
Start: 2024-09-17

## 2024-09-17 RX ORDER — LISINOPRIL 5 MG/1
5 TABLET ORAL DAILY
Qty: 90 TABLET | Refills: 1 | Status: SHIPPED | OUTPATIENT
Start: 2024-09-17

## 2024-09-19 ENCOUNTER — PATIENT MESSAGE (OUTPATIENT)
Dept: INTERNAL MEDICINE CLINIC | Facility: CLINIC | Age: 49
End: 2024-09-19

## 2024-09-19 DIAGNOSIS — L30.8 OTHER ECZEMA: ICD-10-CM

## 2024-09-19 RX ORDER — PEN NEEDLE, DIABETIC 32GX 5/32"
NEEDLE, DISPOSABLE MISCELLANEOUS
Refills: 0 | OUTPATIENT
Start: 2024-09-19

## 2024-09-19 RX ORDER — HYDROCORTISONE 2.5 %
1 CREAM (GRAM) TOPICAL 2 TIMES DAILY
Qty: 30 G | Refills: 2 | Status: SHIPPED | OUTPATIENT
Start: 2024-09-19 | End: 2024-09-20

## 2024-09-19 NOTE — TELEPHONE ENCOUNTER
From: Merna Caceres  To: River Gandara  Sent: 9/19/2024 2:37 PM CDT  Subject: Hydrocortisone 2.5mg    Hi Dr. Gandara,  Can you please refill my hydrocortisone 2.5mg like we discussed at my appointment Tuesday? Thank you!

## 2024-09-19 NOTE — TELEPHONE ENCOUNTER
Last time medication was refilled 2/1/23  Last office visit  9/17/24  Next office visit due/scheduled 4/22/25

## 2024-09-19 NOTE — TELEPHONE ENCOUNTER
Called and spoke to patient. Patient stated she is going to contact her provider that manages medication for renewal.       Patient comment: Prescribed by Dr Mehnaz Mercer     Medication denied.

## 2024-09-20 RX ORDER — HYDROCORTISONE 2.5 %
1 CREAM (GRAM) TOPICAL 2 TIMES DAILY
Qty: 30 G | Refills: 2 | Status: SHIPPED | OUTPATIENT
Start: 2024-09-20

## 2024-10-29 ENCOUNTER — OFFICE VISIT (OUTPATIENT)
Facility: CLINIC | Age: 49
End: 2024-10-29
Payer: COMMERCIAL

## 2024-10-29 VITALS
DIASTOLIC BLOOD PRESSURE: 70 MMHG | SYSTOLIC BLOOD PRESSURE: 126 MMHG | BODY MASS INDEX: 37.27 KG/M2 | WEIGHT: 189.81 LBS | HEIGHT: 60 IN | HEART RATE: 79 BPM

## 2024-10-29 DIAGNOSIS — Z01.419 ENCOUNTER FOR ANNUAL ROUTINE GYNECOLOGICAL EXAMINATION: Primary | ICD-10-CM

## 2024-10-29 PROCEDURE — 99396 PREV VISIT EST AGE 40-64: CPT | Performed by: STUDENT IN AN ORGANIZED HEALTH CARE EDUCATION/TRAINING PROGRAM

## 2024-10-29 NOTE — PROGRESS NOTES
[facility]  Obstetrics and Gynecology   History & Physical    Chief complaint:   Chief Complaint   Patient presents with    Wellness Visit     Annual Exam         HPI: Merna Caceres is a 48 year old  with Patient's last menstrual period was 2024 (exact date).      Here for annual GYN exam, no specific concerns  Menses had been pretty much monthly still, except this past month didn't get a period. Sometimes heavier, sometimes light, have always been this way, not actually a change  No hot flashes   More fatigue in premenstrual period, also having headaches then too  No vaginitis sx, no abnormal discharge or itching    Hx Prior Abnormal Pap: - pt reports none  Pap Date: 23  Pap Result Notes: Negative    PMHx/Surghx reviewed, of note: T2DM & HLD on statin, getting under better control, no changes  Famhx: denies family hx breast/ovarian/colon/uterine cancers    PCP: River Gandara MD    Review of Systems:  Constitutional:  Denies night sweats, hot flashes  Eyes:  denies blurred or double vision  Cardiovascular:  denies chest pain or palpitations  Respiratory:  denies shortness of breath  Gastrointestinal:  denies heartburn, abdominal pain, diarrhea or constipation  Genitourinary:  denies dysuria, incontinence, abnormal vaginal discharge, vaginal itching  Musculoskeletal:  denies back pain.  Skin/Breast:  Denies any breast pain, lumps, or discharge.   Neurological:  denies headaches, extremity weakness or numbness.  Psychiatric: denies depression or anxiety.  Endocrine:   denies excessive thirst or urination.  Heme/Lymph:  denies history of anemia, easy bruising or bleeding.    OB History:  OB History    Para Term  AB Living   0 0           SAB IAB Ectopic Multiple Live Births                   Meds:  Current Outpatient Medications on File Prior to Visit   Medication Sig Dispense Refill    hydrocortisone 2.5 % External Cream Apply 1 Application  topically 2 (two) times daily.  Apply to face twice daily 30 g 2    sertraline 50 MG Oral Tab Take 1 tablet (50 mg total) by mouth daily. 90 tablet 1    rosuvastatin (CRESTOR) 20 MG Oral Tab Take 1 tablet (20 mg total) by mouth daily. 90 tablet 3    lisinopril 5 MG Oral Tab Take 1 tablet (5 mg total) by mouth daily. 90 tablet 1    JARDIANCE 25 MG Oral Tab Take 25 mg by mouth every morning before breakfast. 90 tablet 0    Insulin Glargine-Lixisenatide (SOLIQUA) 100-33 UNT-MCG/ML Subcutaneous Solution Pen-injector Inject into the skin.      BD PEN NEEDLE ENRIQUE U/F 32G X 4 MM Does not apply Misc       Desonide 0.05 % External Ointment Apply 1 g topically daily. 15 g 1    OneTouch Delica Lancets 33G Does not apply Misc 1 Lancet by Finger stick route As Directed.      ALPRAZolam 0.25 MG Oral Tab Take 1 tablet (0.25 mg total) by mouth nightly as needed for Sleep. 90 tablet 0    MetFORMIN HCl (GLUCOPHAGE) 1000 MG Oral Tab Take 1 tablet (1,000 mg total) by mouth daily with breakfast.       No current facility-administered medications on file prior to visit.       All:  No Known Allergies    PMH:  Past Medical History:    Anxiety state, unspecified    Arthritis    Decorative tattoo    Depressive disorder, not elsewhere classified    Diabetes mellitus (HCC)    Obesity, unspecified    Other and unspecified hyperlipidemia    Pap smear for cervical cancer screening    Negative/HPV Negative    Type II or unspecified type diabetes mellitus without mention of complication, not stated as uncontrolled    seeing Endo    Unspecified essential hypertension    Unspecified sleep apnea    Wears glasses       PSH:  Past Surgical History:   Procedure Laterality Date    Hand/finger surgery unlisted Left 3/17/17--Dr. Anne    Thumb MP fusion and ligament reconstruction tendon interposition arthroplasty    Hand/finger surgery unlisted Right 12/30/20--Dr. Adair Anne    thumb ligament reconstruction tendon interposition arthroplasty    Knee surgery  02/20/2015       Social  History:  Social History     Socioeconomic History    Marital status:      Spouse name: Not on file    Number of children: Not on file    Years of education: Not on file    Highest education level: Not on file   Occupational History    Not on file   Tobacco Use    Smoking status: Former     Current packs/day: 0.00     Types: Cigarettes     Quit date: 12/29/2020     Years since quitting: 3.8    Smokeless tobacco: Former    Tobacco comments:     4 cigaretts per day for 10 yrs   Vaping Use    Vaping status: Never Used   Substance and Sexual Activity    Alcohol use: Not Currently    Drug use: No    Sexual activity: Not Currently   Other Topics Concern     Service Not Asked    Blood Transfusions Not Asked    Caffeine Concern No    Occupational Exposure Not Asked    Hobby Hazards Not Asked    Sleep Concern Not Asked    Stress Concern No    Weight Concern No    Special Diet No    Back Care Not Asked    Exercise No    Bike Helmet Not Asked    Seat Belt Yes    Self-Exams Not Asked   Social History Narrative    Not on file     Social Drivers of Health     Financial Resource Strain: Not on file   Food Insecurity: Not on file   Transportation Needs: Not on file   Physical Activity: Not on file   Stress: Not on file   Social Connections: Not on file   Housing Stability: Not on file        Family History:  Family History   Problem Relation Age of Onset    Prostate Cancer Father     Heart Disorder Father         MI, stroke    Diabetes Father         unknown    Cancer Father         prostate    Diabetes Mother         unknown    Hypertension Mother         unknown    Lipids Mother         unknown    Cancer Maternal Grandfather         stomach    Diabetes Sister     Diabetes Brother        PHYSICAL EXAM:     Vitals:    10/29/24 0804   BP: 126/70   Pulse: 79   Weight: 189 lb 12.8 oz (86.1 kg)   Height: 60\"       Body mass index is 37.07 kg/m².      Constitutional: well developed, well nourished  Head/Face:  normocephalic  Lymphatic: no abnormal supraclavicular or axillary adenopathy is noted  Breast: normal without palpable masses, tenderness, asymmetry, nipple discharge, nipple retraction or skin changes  Abdomen:  soft, nontender, nondistended, no masses  Skin/Hair: no unusual rashes or bruises  Extremities: no edema, no cyanosis  Psychiatric:  Oriented to time, place, person and situation. Appropriate mood and affect    Pelvic Exam:  External Genitalia: normal appearance, hair distribution, and no lesions  Urethral Meatus:  normal in size, location, without lesions and prolapse  Bladder:  No fullness, masses or tenderness  Vagina:  Normal appearance without lesions, no abnormal discharge  Cervix:  Normal without tenderness on motion  Uterus: normal in size, contour, position, mobility, without tenderness  Adnexa: normal without masses or tenderness  Perineum: normal  Anus: no hemorrhoids     Assessment & Plan:     Merna Caceres is a 48 year old      Diagnoses and all orders for this visit:    Encounter for annual routine gynecological examination       Normal breast and pelvic exam      Pap done 2023 NILM HPV neg, no hx abnormals, discussed ASCCP guidelines  Mammo 2024 neg  Colonoscopy done 2023, repeat 3y          Katja Benedict MD  [facility] - OBGYN

## 2024-11-12 ENCOUNTER — LAB ENCOUNTER (OUTPATIENT)
Dept: LAB | Facility: HOSPITAL | Age: 49
End: 2024-11-12
Attending: PREVENTIVE MEDICINE
Payer: COMMERCIAL

## 2024-11-12 ENCOUNTER — TELEPHONE (OUTPATIENT)
Dept: INTERNAL MEDICINE CLINIC | Facility: HOSPITAL | Age: 49
End: 2024-11-12

## 2024-11-12 DIAGNOSIS — Z20.822 SUSPECTED COVID-19 VIRUS INFECTION: ICD-10-CM

## 2024-11-12 DIAGNOSIS — Z20.822 SUSPECTED COVID-19 VIRUS INFECTION: Primary | ICD-10-CM

## 2024-11-12 LAB — SARS-COV-2 RNA RESP QL NAA+PROBE: NOT DETECTED

## 2024-11-19 ENCOUNTER — OFFICE VISIT (OUTPATIENT)
Dept: INTERNAL MEDICINE CLINIC | Facility: CLINIC | Age: 49
End: 2024-11-19
Payer: COMMERCIAL

## 2024-11-19 VITALS
TEMPERATURE: 98 F | OXYGEN SATURATION: 97 % | SYSTOLIC BLOOD PRESSURE: 136 MMHG | BODY MASS INDEX: 38.12 KG/M2 | WEIGHT: 194.19 LBS | HEART RATE: 94 BPM | HEIGHT: 60 IN | DIASTOLIC BLOOD PRESSURE: 74 MMHG

## 2024-11-19 DIAGNOSIS — E11.8 DIABETES MELLITUS WITH COMPLICATION (HCC): ICD-10-CM

## 2024-11-19 DIAGNOSIS — J01.00 ACUTE NON-RECURRENT MAXILLARY SINUSITIS: Primary | ICD-10-CM

## 2024-11-19 PROCEDURE — 99214 OFFICE O/P EST MOD 30 MIN: CPT | Performed by: PHYSICIAN ASSISTANT

## 2024-11-19 RX ORDER — PREDNISONE 20 MG/1
40 TABLET ORAL DAILY
Qty: 10 TABLET | Refills: 0 | Status: SHIPPED | OUTPATIENT
Start: 2024-11-19 | End: 2024-11-24

## 2024-11-19 NOTE — PATIENT INSTRUCTIONS
Take antibiotic and prednisone as directed until completely finished. Contact us if you experience any adverse reaction to the medication.      Have labs drawn, fasting 8-10 hours prior (water before is ok).    Schedule diabetic eye exam

## 2024-11-19 NOTE — PROGRESS NOTES
HPI:   Pt c/o URI x 8 days, sinus pressure, R ear pressure, cough, some chest tightness. Denies fever, sore throat or wheezing. COVID negative on day 1 of symptoms.    Blood sugar has been fine recently, 120-130's, pt wears dexcom. Currently glucose is 230, she just had lunch    Current Outpatient Medications   Medication Sig Dispense Refill    amoxicillin clavulanate 875-125 MG Oral Tab Take 1 tablet by mouth 2 (two) times daily for 10 days. 20 tablet 0    predniSONE 20 MG Oral Tab Take 2 tablets (40 mg total) by mouth daily for 5 days. 10 tablet 0    hydrocortisone 2.5 % External Cream Apply 1 Application  topically 2 (two) times daily. Apply to face twice daily 30 g 2    sertraline 50 MG Oral Tab Take 1 tablet (50 mg total) by mouth daily. 90 tablet 1    rosuvastatin (CRESTOR) 20 MG Oral Tab Take 1 tablet (20 mg total) by mouth daily. 90 tablet 3    lisinopril 5 MG Oral Tab Take 1 tablet (5 mg total) by mouth daily. 90 tablet 1    JARDIANCE 25 MG Oral Tab Take 25 mg by mouth every morning before breakfast. 90 tablet 0    Insulin Glargine-Lixisenatide (SOLIQUA) 100-33 UNT-MCG/ML Subcutaneous Solution Pen-injector Inject into the skin.      BD PEN NEEDLE ENRIQUE U/F 32G X 4 MM Does not apply Misc       Desonide 0.05 % External Ointment Apply 1 g topically daily. 15 g 1    OneTouch Delica Lancets 33G Does not apply Misc 1 Lancet by Finger stick route As Directed.      ALPRAZolam 0.25 MG Oral Tab Take 1 tablet (0.25 mg total) by mouth nightly as needed for Sleep. 90 tablet 0    MetFORMIN HCl (GLUCOPHAGE) 1000 MG Oral Tab Take 1 tablet (1,000 mg total) by mouth daily with breakfast.        Past Medical History:    Anxiety state, unspecified    Arthritis    Decorative tattoo    Depressive disorder, not elsewhere classified    Diabetes mellitus (HCC)    Obesity, unspecified    Other and unspecified hyperlipidemia    Pap smear for cervical cancer screening    Negative/HPV Negative    Type II or unspecified type diabetes  mellitus without mention of complication, not stated as uncontrolled    seeing Endo    Unspecified essential hypertension    Unspecified sleep apnea    Wears glasses      Past Surgical History:   Procedure Laterality Date    Hand/finger surgery unlisted Left 3/17/17--Dr. Anne    Thumb MP fusion and ligament reconstruction tendon interposition arthroplasty    Hand/finger surgery unlisted Right 12/30/20--Dr. Adair Anne    thumb ligament reconstruction tendon interposition arthroplasty    Knee surgery  02/20/2015      Family History   Problem Relation Age of Onset    Prostate Cancer Father     Heart Disorder Father         MI, stroke    Diabetes Father         unknown    Cancer Father         prostate    Diabetes Mother         unknown    Hypertension Mother         unknown    Lipids Mother         unknown    Cancer Maternal Grandfather         stomach    Diabetes Sister     Diabetes Brother       Social History     Socioeconomic History    Marital status:    Tobacco Use    Smoking status: Former     Current packs/day: 0.00     Types: Cigarettes     Quit date: 12/29/2020     Years since quitting: 3.8    Smokeless tobacco: Former    Tobacco comments:     4 cigaretts per day for 10 yrs   Vaping Use    Vaping status: Never Used   Substance and Sexual Activity    Alcohol use: Not Currently    Drug use: No    Sexual activity: Not Currently   Other Topics Concern    Caffeine Concern No    Stress Concern No    Weight Concern No    Special Diet No    Exercise No    Seat Belt Yes         REVIEW OF SYSTEMS:   GENERAL: feeling poorly.   SKIN: no rashes  EYES: denies blurred vision, eye irritation or discharge  HEENT: congested; no difficulty swallowing or discharge from ears  LUNGS: denies shortness of breath, wheezing, hemoptysis  CARDIOVASCULAR: denies chest pain, palpitations or edema  GI: no nausea, vomiting or diarrhea  NEURO: denies dizziness, weakness or syncope    EXAM:   /74   Pulse 94   Temp 98.4 °F  (36.9 °C) (Oral)   Ht 5' (1.524 m)   Wt 194 lb 3.2 oz (88.1 kg)   LMP 09/03/2024 (Exact Date)   SpO2 97%   BMI 37.93 kg/m²   GENERAL: well developed, well nourished,in no apparent distress  SKIN: no rashes, no suspicious lesions  EYES: PERRLA, EOMI, conjunctiva are clear  HEENT: atraumatic, normocephalic, TM's pearly gray with light reflex, oropharynx without erythema, exudates or tonsillar hypertrophy. Nasal turbinates inflamed.  NECK: supple, no adenopathy, FROM  LUNGS: few scattered rales. Normal effort. No rhonchi or wheezes.   CARDIO: RRR without murmur rub or gallop  GI: soft, non-distended and nontender, no CVA tenderness    ASSESSMENT AND PLAN:   1. Acute non-recurrent maxillary sinusitis (Primary)  COVID negative. Augmentin and prednisone as directed  -     Amoxicillin-Pot Clavulanate; Take 1 tablet by mouth 2 (two) times daily for 10 days.  Dispense: 20 tablet; Refill: 0  -     predniSONE; Take 2 tablets (40 mg total) by mouth daily for 5 days.  Dispense: 10 tablet; Refill: 0  2. Diabetes mellitus with complication (HCC)   Glucose improved per patient with CGM. Monitor closely while on prednisone. Continue jardiance, metformin. Check labs, pt has orders. Reminded to complete eye exam.       The patient indicates understanding of these issues and agrees to the plan.  The patient is asked to return if sx's persist or worsen.

## 2025-01-10 ENCOUNTER — OFFICE VISIT (OUTPATIENT)
Dept: ORTHOPEDICS CLINIC | Facility: CLINIC | Age: 50
End: 2025-01-10
Payer: COMMERCIAL

## 2025-01-10 ENCOUNTER — TELEPHONE (OUTPATIENT)
Dept: ORTHOPEDICS CLINIC | Facility: CLINIC | Age: 50
End: 2025-01-10

## 2025-01-10 DIAGNOSIS — M17.11 PRIMARY OSTEOARTHRITIS OF RIGHT KNEE: Primary | ICD-10-CM

## 2025-01-10 RX ORDER — KETOROLAC TROMETHAMINE 30 MG/ML
30 INJECTION, SOLUTION INTRAMUSCULAR; INTRAVENOUS ONCE
Status: COMPLETED | OUTPATIENT
Start: 2025-01-10 | End: 2025-01-10

## 2025-01-10 RX ORDER — TRIAMCINOLONE ACETONIDE 40 MG/ML
40 INJECTION, SUSPENSION INTRA-ARTICULAR; INTRAMUSCULAR ONCE
Status: COMPLETED | OUTPATIENT
Start: 2025-01-10 | End: 2025-01-10

## 2025-01-10 RX ADMIN — TRIAMCINOLONE ACETONIDE 40 MG: 40 INJECTION, SUSPENSION INTRA-ARTICULAR; INTRAMUSCULAR at 08:15:00

## 2025-01-10 RX ADMIN — KETOROLAC TROMETHAMINE 30 MG: 30 INJECTION, SOLUTION INTRAMUSCULAR; INTRAVENOUS at 08:15:00

## 2025-01-10 NOTE — PROCEDURES
Right Knee Intra-articular Injection    Name: Merna Caceres   MRN: [id number]  Date: 1/10/2025     Clinical Indications:   Knee Osteoarthritis with symptoms refractory to conservative measures.     After informed consent, the injection site was marked, sterilized with topical chlorhexidine antiseptic, and locally anesthetized with skin refrigerant.    The patient was situation in a comfortable position. Using sterile technique: 1 mL of 30mg/mL of Ketorolac, 2 mL of 0.5% Bupivicaine, 2 mL of 1% Lidocaine, and 1 mL of 40 mg/ml Triamcinolone was injected utilizing anterolateral approach with a 22 gauge needle.  A band-aid was applied.  The patient tolerated the procedure well.        Shital Caballero MD  Knee, Shoulder, & Elbow Surgery / Sports Medicine Specialist  Orthopaedic Surgery  [address] [place] [address].org  [email or web address]  t: [phone]  o: [phone]  f: [phone]

## 2025-01-10 NOTE — PROGRESS NOTES
Orthopaedic Surgery  [address] [place] [id number]  [phone]     Name: Merna Caceres   MRN: [id number]  Date: 1/10/2025     REASON FOR VISIT: Right knee pain, recurrent    INTERVAL HISTORY:   Merna Caceres is a very pleasant 49 year old female who returns today for evaluation of right knee pain that has recurred, with a history of knee arthroscopy in 2015 at [facility]. Most recent intra-articular injection of corticosteroid and ketorolac was 12/13/2023 that she states provided significant relief until the last couple of months. Within the last few weeks the knee has gotten swollen and much more painful, particularly when walking up and down stairs. Post-surgery, symptoms had temporarily resolved, but subsequent steroid injections provided only temporary relief. The pain is rated up to 7/10 and is associated with a sensation of \"stuffing\" being in the knee. Both anti-inflammatories and physical therapy have offered temporary relief.      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 She has 2 dogs.  She is a type II diabetic with a recent hemoglobin A1c of 9.3 on 3/8/2024.      PE:   There were no vitals filed for this visit.  Estimated body mass index is 37.93 kg/m² as calculated from the following:    Height as of 11/19/24: 5' (1.524 m).    Weight as of 11/19/24: 194 lb 3.2 oz.    Physical Exam  Constitutional:       Appearance: Normal appearance.   HENT:      Head: Normocephalic and atraumatic.   Eyes:      Extraocular Movements: Extraocular movements intact.   Neck:      Musculoskeletal: Normal range of motion and neck supple.   Cardiovascular:      Pulses: Normal pulses.   Pulmonary:      Effort: Pulmonary effort is normal. No respiratory distress.   Abdominal:      General: There is no distension.   Skin:     General: Skin is warm.      Capillary Refill: Capillary refill takes less than 2 seconds.      Findings: No bruising.    Neurological:      General: No focal deficit present.      Mental Status: She is alert.   Psychiatric:         Mood and Affect: Mood normal.     Examination of the right knee demonstrates:     Skin is intact, warm and dry.   Atrophy: none    Effusion: small    Joint line tenderness: both  Crepitation: none   Marleny: Positive   Patellar mobility: normal without apprehension  J-sign: none    ROM: Extension  5 degrees  Flexion  110 degrees  ACL:  Negative Lachman, Negative Pivot Shift   PCL:  Negative Posterior Drawer  Collateral Ligaments: Stable to Varus and Valgus stress at 0 and 30 degrees  Strength: normal   Hip joint: normal pain-free ROM   Gait:  mildly antalgic   Leg length: equal and symmetric  Alignment:  neutral     No obvious peripheral edema noted.   Distal neurovascular exam demonstrates normal perfusion, intact sensation to light touch and full strength.     Examination of the contralateral knee demonstrates:  No significant atrophy, swelling or effusion. Full range of motion. Neurovascularly intact distally    IMPRESSION: Merna Caceres is a 49 year old female with right knee primary osteoarthritis, with recurrent pain, recently symptomatic for the last few months, getting progressively worse the last few weeks.    We elected to maximize conservative management with intra-articular corticosteroid and ketorolac injection at this time.    PLAN:   We reviewed the treatment of this disease condition.  Fortunately, treatment is amenable to conservative treatment which we chose to optimize at today's visit.  After a discussion of a variety of conservative treatment options, I recommended intra-articular injection with corticosteroid and ketorolac coupled with physical therapy to aid in strengthening, range of motion, functional improvement, and return to baseline activity.       We elected to proceed with the injection procedure at today's visit. We discussed the risk and benefits of the procedure;  including, but not limited to: infection, injury to blood vessels, nerve injury, prolonged pain, swelling, site soreness, failure to progress, and need for advanced treatments.  The patient voiced understanding and agreed to proceed with the treatment plan.     I also placed a future order for Durolane injection to be administered if approved by insurance.    Based on recurrence of symptoms, I also recommended she look into possibly getting a knee replacement in the future. I provided her information to contact a joint specialist to further investigate.     The patient had the opportunity to ask questions and all questions were answered appropriately.     FOLLOW-UP:   Return to clinic on an as needed basis.         Shital Caballero MD  Knee, Shoulder, & Elbow Surgery / Sports Medicine Specialist  Orthopaedic Surgery  [address] [place] [email or web address]  [email or web address]  t: [phone]  o: [phone]  f: [phone]    I, Marisela Corea, attest that this documentation has been prepared under the direction and in the presence of Dr. Shital Caballero MD.     This note was dictated using Dragon software.  While it was briefly proofread prior to completion, some grammatical, spelling, and word choice errors due to dictation may still occur.

## 2025-02-05 ENCOUNTER — OFFICE VISIT (OUTPATIENT)
Facility: LOCATION | Age: 50
End: 2025-02-05
Payer: COMMERCIAL

## 2025-02-05 DIAGNOSIS — L60.0 INGROWN TOENAIL OF LEFT FOOT WITH INFECTION: ICD-10-CM

## 2025-02-05 DIAGNOSIS — M79.675 TOE PAIN, LEFT: ICD-10-CM

## 2025-02-05 DIAGNOSIS — E11.8 DIABETES MELLITUS WITH COMPLICATION (HCC): Primary | ICD-10-CM

## 2025-02-05 DIAGNOSIS — L60.3 NAIL DYSTROPHY: ICD-10-CM

## 2025-02-05 NOTE — PROGRESS NOTES
[facility]  Progress Note      Merna Caceres is a 49 year old female.   Chief Complaint   Patient presents with    New Patient     Patient is here for left hallux lateral side ingrown nail. She is diabetic, has history of multiple ingrown toenail procedures. She has thick nails that curve inwards. She did cut out a piece of nail. Toe is red and swollen, 2/10. FBS this am 124. Denies any fever or chills.         HPI:     Merna Caceres is a pleasant 49 year old female who presents to the clinic today for diabetic foot exam, nail trim, and c/o pain and redness to left hallux bilateral borders. Pt is ambulatory in [employer] shoes today. Pt mentions that she does not have a podiatrist that she sees regularly.   Pt complains of elongated, thickened, and incurvated nails and has difficulty trimming them on her own. Today she specifically complains of pain to bilateral borders of the left hallux associated with redness and swelling for the past couple days. Lateral side worse than the medial side.  Denies any discharge. Patient states that she tried to take out the ingrown herself but continues to have pain.  Patient mentions that she has a long history of having ingrown toenails.   States that she does have a history of diabetes.  Fasting blood sugar this morning is 124.  Last hemoglobin A1c 9.3 on 3/8/2024.  However, patient does mention to me that her last hemoglobin A1c that she can recall a few months ago was in the 8's.    Past medical history, medications, allergies reviewed.   Pt denies any fever, chills, n/v.    Allergies: Patient has no known allergies.   Current Outpatient Medications   Medication Sig Dispense Refill    amoxicillin clavulanate 875-125 MG Oral Tab Take 1 tablet by mouth 2 (two) times daily for 7 days. 14 tablet 0    hydrocortisone 2.5 % External Cream Apply 1 Application  topically 2 (two) times daily. Apply to face twice daily 30 g 2    sertraline 50 MG Oral Tab Take 1 tablet (50  mg total) by mouth daily. 90 tablet 1    rosuvastatin (CRESTOR) 20 MG Oral Tab Take 1 tablet (20 mg total) by mouth daily. 90 tablet 3    lisinopril 5 MG Oral Tab Take 1 tablet (5 mg total) by mouth daily. 90 tablet 1    JARDIANCE 25 MG Oral Tab Take 25 mg by mouth every morning before breakfast. 90 tablet 0    Insulin Glargine-Lixisenatide (SOLIQUA) 100-33 UNT-MCG/ML Subcutaneous Solution Pen-injector Inject into the skin.      BD PEN NEEDLE ENRIQUE U/F 32G X 4 MM Does not apply Misc       Desonide 0.05 % External Ointment Apply 1 g topically daily. 15 g 1    OneTouch Delica Lancets 33G Does not apply Misc 1 Lancet by Finger stick route As Directed.      ALPRAZolam 0.25 MG Oral Tab Take 1 tablet (0.25 mg total) by mouth nightly as needed for Sleep. 90 tablet 0    MetFORMIN HCl (GLUCOPHAGE) 1000 MG Oral Tab Take 1 tablet (1,000 mg total) by mouth daily with breakfast.        Past Medical History:    Anxiety state, unspecified    Arthritis    Decorative tattoo    Depressive disorder, not elsewhere classified    Diabetes mellitus (HCC)    Obesity, unspecified    Other and unspecified hyperlipidemia    Pap smear for cervical cancer screening    Negative/HPV Negative    Type II or unspecified type diabetes mellitus without mention of complication, not stated as uncontrolled    seeing Endo    Unspecified essential hypertension    Unspecified sleep apnea    Wears glasses      Past Surgical History:   Procedure Laterality Date    Hand/finger surgery unlisted Left 3/17/17--Dr. Anne    Thumb MP fusion and ligament reconstruction tendon interposition arthroplasty    Hand/finger surgery unlisted Right 12/30/20--Dr. Adair Anne    thumb ligament reconstruction tendon interposition arthroplasty    Knee surgery  02/20/2015      Family History   Problem Relation Age of Onset    Prostate Cancer Father     Heart Disorder Father         MI, stroke    Diabetes Father         unknown    Cancer Father         prostate    Diabetes Mother          unknown    Hypertension Mother         unknown    Lipids Mother         unknown    Cancer Maternal Grandfather         stomach    Diabetes Sister     Diabetes Brother       Social History     Socioeconomic History    Marital status:    Tobacco Use    Smoking status: Former     Current packs/day: 0.00     Types: Cigarettes     Quit date: 2020     Years since quittin.1    Smokeless tobacco: Former    Tobacco comments:     4 cigaretts per day for 10 yrs   Vaping Use    Vaping status: Never Used   Substance and Sexual Activity    Alcohol use: Not Currently    Drug use: No    Sexual activity: Not Currently   Other Topics Concern    Caffeine Concern No    Stress Concern No    Weight Concern No    Special Diet No    Exercise No    Seat Belt Yes           REVIEW OF SYSTEMS:     10 point ROS completed and was negative, except for pertinent positive and negatives stated in subjective.       EXAM:   GENERAL: well developed, well nourished, in no apparent distress  EXTREMITIES:  1. Integument: The patient's nails appear incurvated, thickened, elongated, dystrophic, discolored with subungual debris 1-5 right, 1-5 left nails. Skin appears mostly moist except the heels that are dry, warm, and supple with positive hair growth. There are no color changes. No open lesions. No macerations. No Hyperkeratotic lesions. Left hallux is reddened. No discharge.       2. Vascular: Pedal pulses palpable, capillary refill normal. Mild swelling to left hallux.  3. Neurological: Gross sensation intact via light touch bilaterally.  Normal sharp/dull sensation. Monofilament test with normal sensation with Lafitte Addie monofilament testing.   4. Musculoskeletal: All muscle groups are graded 5/5 in the foot and ankle. Patient has pain with palpation of bilateral borders of left hallux nail.       ASSESSMENT AND PLAN:   Diagnoses and all orders for this visit:    Diabetes mellitus with complication (HCC)    Ingrown toenail  of left foot with infection    Nail dystrophy    Toe pain, left    Other orders  -     amoxicillin clavulanate 875-125 MG Oral Tab; Take 1 tablet by mouth 2 (two) times daily for 7 days.        Plan:   - Patient was seen and evaluated today in clinic.  Chart history reviewed.    - At today's visit trimmed down sharply debrided nails x10 with a sterile nail nipper achieving a 20% reduction in thickness and length, without incident. Slant back procedure performed to ingrown nails. Nails further smoothed with emery board. Patient makes mention that pain in bilateral hallux borders of nails are relieved. Areas of ingrown to left hallux dressed with antibiotic ointment and band-aid.  - Discussed importance of proper pedal hygiene, regular foot checks, and tight glucose control by following diet and medication regimen.   - Pt was educated to keep her feet clean; ensure that she washes and dries between toes.  - Educated the patient on the use of a good moisturizing cream such as Amlactin or Urea cream.  - Patient to avoid walking barefoot. Recommend ambulating with supportive shoes and inserts. Show gear recommendations given to patient.  - Augmentin sent to pharmacy.  - Educated patient on acute signs of infection.  Advised patient to seek immediate medical attention if any concerns arise.  - All of the patient's questions and concerns were addressed.  They indicated their understanding of these issues and agrees to the plan.      Time spent reviewing pertinent information from patient's chart, reviewing any pertinent imaging, obtaining history and physical exam, discussing and mutually agreeing on a treatment plan, and documenting encounter: 30 minutes    RTC 3 months or sooner if ingrown returns.      RAYNA George        [facility]            Dragon speech recognition software was used to prepare this note.  Errors in word recognition may occur.  Please contact me with any  questions/concerns with this note.

## 2025-02-28 ENCOUNTER — LAB ENCOUNTER (OUTPATIENT)
Dept: LAB | Age: 50
End: 2025-02-28
Attending: INTERNAL MEDICINE
Payer: COMMERCIAL

## 2025-02-28 DIAGNOSIS — E11.8 DIABETES MELLITUS WITH COMPLICATION (HCC): ICD-10-CM

## 2025-02-28 DIAGNOSIS — E83.52 SERUM CALCIUM ELEVATED: ICD-10-CM

## 2025-02-28 DIAGNOSIS — E78.2 MIXED HYPERLIPIDEMIA: ICD-10-CM

## 2025-02-28 LAB
ALBUMIN SERPL-MCNC: 4.7 G/DL (ref 3.2–4.8)
ALBUMIN/GLOB SERPL: 1.6 {RATIO} (ref 1–2)
ALP LIVER SERPL-CCNC: 84 U/L
ALT SERPL-CCNC: 15 U/L
ANION GAP SERPL CALC-SCNC: 5 MMOL/L (ref 0–18)
AST SERPL-CCNC: 17 U/L (ref ?–34)
BILIRUB SERPL-MCNC: 0.4 MG/DL (ref 0.3–1.2)
BUN BLD-MCNC: 22 MG/DL (ref 9–23)
CALCIUM BLD-MCNC: 11.3 MG/DL (ref 8.7–10.6)
CHLORIDE SERPL-SCNC: 107 MMOL/L (ref 98–112)
CHOLEST SERPL-MCNC: 111 MG/DL (ref ?–200)
CO2 SERPL-SCNC: 26 MMOL/L (ref 21–32)
CREAT BLD-MCNC: 1.14 MG/DL
CREAT UR-SCNC: 43 MG/DL
EGFRCR SERPLBLD CKD-EPI 2021: 58 ML/MIN/1.73M2 (ref 60–?)
EST. AVERAGE GLUCOSE BLD GHB EST-MCNC: 183 MG/DL (ref 68–126)
FASTING PATIENT LIPID ANSWER: YES
FASTING STATUS PATIENT QL REPORTED: YES
GLOBULIN PLAS-MCNC: 3 G/DL (ref 2–3.5)
GLUCOSE BLD-MCNC: 138 MG/DL (ref 70–99)
HBA1C MFR BLD: 8 % (ref ?–5.7)
HDLC SERPL-MCNC: 38 MG/DL (ref 40–59)
LDLC SERPL CALC-MCNC: 55 MG/DL (ref ?–100)
MICROALBUMIN UR-MCNC: 0.5 MG/DL
MICROALBUMIN/CREAT 24H UR-RTO: 11.6 UG/MG (ref ?–30)
NONHDLC SERPL-MCNC: 73 MG/DL (ref ?–130)
OSMOLALITY SERPL CALC.SUM OF ELEC: 292 MOSM/KG (ref 275–295)
POTASSIUM SERPL-SCNC: 4.6 MMOL/L (ref 3.5–5.1)
PROT SERPL-MCNC: 7.7 G/DL (ref 5.7–8.2)
SODIUM SERPL-SCNC: 138 MMOL/L (ref 136–145)
TRIGL SERPL-MCNC: 94 MG/DL (ref 30–149)
VLDLC SERPL CALC-MCNC: 14 MG/DL (ref 0–30)

## 2025-02-28 PROCEDURE — 83036 HEMOGLOBIN GLYCOSYLATED A1C: CPT

## 2025-02-28 PROCEDURE — 80061 LIPID PANEL: CPT

## 2025-02-28 PROCEDURE — 82570 ASSAY OF URINE CREATININE: CPT

## 2025-02-28 PROCEDURE — 80053 COMPREHEN METABOLIC PANEL: CPT

## 2025-02-28 PROCEDURE — 36415 COLL VENOUS BLD VENIPUNCTURE: CPT

## 2025-02-28 PROCEDURE — 82043 UR ALBUMIN QUANTITATIVE: CPT

## 2025-03-02 NOTE — PROGRESS NOTES
Merna Caceres is a 49 year old presenting today to establish for diabetes management.   Primary care physician: River Gandara MD  Lab Results   Component Value Date    A1C 8.0 (H) 02/28/2025    A1C 9.3 (H) 03/08/2024    A1C 11.4 (H) 05/17/2023    A1C 9.0 (A) 07/06/2020    A1C 9.2 (A) 08/19/2019         Subjective:    Initial HPI consult in March 2025  Documentation from MA: Diabetes (New patient Dexcom G7 streaming with clinic )    Most recent A1c value was 8% done 2/28/2025.      Lab Results   Component Value Date    GMI 7.2 03/03/2025      Has been wearing dexcom past few months and feels this has really connected her diet, stress and activity w her BLOOD SUGAR trends.     Really likes it but cost is concern along w her other prescription costs.     Has been managed by Dr Mojica , endocrinology     Admits to poor control  for many years since she \"did not take her Diabetes seriously\"   Does feel much better with her improved trends  Feels since starting Soliqua, her trends have improved tremendously   However, , Admits during URI had steroid prescription and trends increased back into 300s     Diabetes diagnosis:   Diagnosed w/ DM age: mid 20s   Family history of DM: yes   Denies any T1DM or autoimmune disorders     Reviewed CGM report/trends w patient today:    Dexcom (full download in media)   Sensor active time: 94 %    4 week (28-30 days)  GMI 7.2 %    Average glucose: 163 mg/dl     Hypoglycemia 0%    TIR 72 %  (ADA recommended goal > 70%)   Variability WNL ( < 33%)   Highest trends after 8 pm (eating late dinner)             Diabetes History:  Dx ~ 1990s   Patient has not had hospitalizations for blood sugar issues  denies any history of pancreatitis      Previous DM therapies:  Glyburide--> lack of control   Byetta --> change in therapy   Actos ---> change in therapy , lack of control         Current DM Regimen:  Jardiance 25mg once daily   Metformin 1000 mg once daily in AM   Soliqua solostar 18  units once daily         HGBA1C:    Lab Results   Component Value Date    A1C 8.0 (H) 02/28/2025    A1C 9.3 (H) 03/08/2024    A1C 11.4 (H) 05/17/2023     (H) 02/28/2025       Lab Results   Component Value Date    CHOLEST 111 02/28/2025    CHOLEST 116 03/08/2024    TRIG 94 02/28/2025    TRIG 147 03/08/2024    HDL 38 (L) 02/28/2025    HDL 49 03/08/2024    LDL 55 02/28/2025    LDL 42 03/08/2024     Lab Results   Component Value Date    MICROALBCREA 11.6 02/28/2025    MICROALBCREA 63.5 (H) 03/08/2024      Lab Results   Component Value Date    CREATSERUM 1.14 (H) 02/28/2025    CREATSERUM 1.10 (H) 03/08/2024    EGFRCR 58 (L) 02/28/2025    EGFRCR 61 03/08/2024     Lab Results   Component Value Date    AST 17 02/28/2025    AST 12 (L) 03/08/2024    ALT 15 02/28/2025    ALT 21 03/08/2024       Lab Results   Component Value Date    TSH 1.620 03/08/2024    TSH 1.540 05/17/2023         DM Complications:  Microvascular:   Neuropathy: no  Retinopathy: no  Nephropathy: yes     Macrovascular:  PVD: no  CAD: no  Stroke/CVA: no        Modifying factors:  Medication adherence: yes   Barriers: no    Recent steroids, illness or infections ( past 3m): yes      Allergies: Patient has no known allergies.    Past Medical History:    Anxiety state, unspecified    Arthritis    Decorative tattoo    Depressive disorder, not elsewhere classified    Diabetes mellitus (HCC)    Obesity, unspecified    Other and unspecified hyperlipidemia    Pap smear for cervical cancer screening    Negative/HPV Negative    Type II or unspecified type diabetes mellitus without mention of complication, not stated as uncontrolled    seeing Endo    Unspecified essential hypertension    Unspecified sleep apnea    Wears glasses     Past Surgical History:   Procedure Laterality Date    Hand/finger surgery unlisted Left 3/17/17--Dr. Anne    Thumb MP fusion and ligament reconstruction tendon interposition arthroplasty    Hand/finger surgery unlisted Right  20--Dr. Adair Anne    thumb ligament reconstruction tendon interposition arthroplasty    Knee surgery  2015     Social History     Socioeconomic History    Marital status:    Tobacco Use    Smoking status: Former     Current packs/day: 0.00     Types: Cigarettes     Quit date: 2020     Years since quittin.1    Smokeless tobacco: Former    Tobacco comments:     4 cigaretts per day for 10 yrs   Vaping Use    Vaping status: Never Used   Substance and Sexual Activity    Alcohol use: Not Currently    Drug use: No    Sexual activity: Not Currently   Other Topics Concern    Caffeine Concern No    Stress Concern No    Weight Concern No    Special Diet No    Exercise No    Seat Belt Yes     Family History   Problem Relation Age of Onset    Prostate Cancer Father     Heart Disorder Father         MI, stroke    Diabetes Father         unknown    Cancer Father         prostate    Diabetes Mother         unknown    Hypertension Mother         unknown    Lipids Mother         unknown    Cancer Maternal Grandfather         stomach    Diabetes Sister     Diabetes Brother      Current Medication List:   Current Outpatient Medications   Medication Sig Dispense Refill    Insulin Lispro-aabc, 1 U Dial, (LYUMJEV KWIKPEN) 100 UNIT/ML Subcutaneous Solution Pen-injector As directed 3 mL 0    sertraline 50 MG Oral Tab Take 1 tablet (50 mg total) by mouth daily. 90 tablet 1    rosuvastatin (CRESTOR) 20 MG Oral Tab Take 1 tablet (20 mg total) by mouth daily. 90 tablet 3    lisinopril 5 MG Oral Tab Take 1 tablet (5 mg total) by mouth daily. 90 tablet 1    JARDIANCE 25 MG Oral Tab Take 25 mg by mouth every morning before breakfast. 90 tablet 0    Insulin Glargine-Lixisenatide (SOLIQUA) 100-33 UNT-MCG/ML Subcutaneous Solution Pen-injector Inject into the skin.      MetFORMIN HCl (GLUCOPHAGE) 1000 MG Oral Tab Take 1 tablet (1,000 mg total) by mouth daily with breakfast.      hydrocortisone 2.5 % External Cream  Apply 1 Application  topically 2 (two) times daily. Apply to face twice daily 30 g 2    BD PEN NEEDLE ENRIQUE U/F 32G X 4 MM Does not apply Misc       Desonide 0.05 % External Ointment Apply 1 g topically daily. 15 g 1    OneTouch Delica Lancets 33G Does not apply Misc 1 Lancet by Finger stick route As Directed.      ALPRAZolam 0.25 MG Oral Tab Take 1 tablet (0.25 mg total) by mouth nightly as needed for Sleep. 90 tablet 0           DM associated review of  symptoms:   Endocrine: Polyuria, polyphagia, polydipsia: no  Neurological: Paresthesias: no  HEENT: Blurred vision: no  Skin: no rash or wounds  Hematological: Hypoglycemia: no      Review of Systems     LUNGS: denies shortness of breath   CARDIOVASCULAR: denies chest pain  GI: denies abdominal pain, nausea or diarrhea   : denies dysuria  MUSCULOSKELETAL: denies pain        Physical exam:  /68   Pulse 100   Resp 16   Wt 197 lb (89.4 kg)   LMP 02/07/2025 (Exact Date)   SpO2 98%   BMI 38.47 kg/m²   Body mass index is 38.47 kg/m².    Physical Exam   Vitals reviewed.  Constitutional: Normal appearance   Cardiovascular: Normal rate , rhythm   Pulmonary/Chest: Effort normal  Neurological: Alert and oriented .   Psychiatric: Normal mood and affect.   R lower abd with lipohypertrophy     Physical Exam  Skin:               Assessment/Plan:    Hypertension  Well controlled but needs ongoing monitoring   CPM         Dyslipidemia:   Last  labs done 2.2025    Continue atorvastatin prescription       CKD stage 3 due to type 2 diabetes mellitus (HCC)  Repeat labs in 3-4 weeks   On ace prescription, SGLT2i   Discussed ozempic consideration given her kidney labs     Serum calcium elevated  Repeat lab; if remains elevated, discuss results with PCP         Type 2 diabetes mellitus with hyperglycemia, with long-term current use of insulin ([facility])  A1C: 8.0%    Lab Results   Component Value Date    GMI 7.2 03/03/2025    Weight: 197 lb     Diabetes control is sub optimal  .  Reviewed with patient health impact associated with high glucose trends and the importance of better glucose control to prevent onset /progression of Diabetes complications such as   retinopathy, neuropathy, nephropathy and cardiovascular disease..   Recommendations:   Start using site rotation for subcutaneous injections; avoid lower R abd due to lipohypertrophy     Start prandial insulin for sliding scale   Start  Lyumjev insulin as needed.     Check BLOOD SUGAR before OR 2 hours after meal:     If blood sugar is 180- 240, take 3 units of insulin   If blood sugar is over 241 , take 4 units of insulin     Consider stopping Soliqua once T1DM labs completed/resulted   Consider Ozempic ~   Reminded patient dosing precautions for dosing in AM and timing of PM dose     For now, continue   Dexcom G7   Jardiance 25mg once daily   Metformin 1000 mg once daily in AM   Soliqua solostar 18 units once daily     Reviewed w patient pathophysiology of diabetes, clinical significance of A1c, adverse effects of suboptimal glucose control, and goals of therapy   We discussed the importance of glycemic control to prevent complications of diabetes  We also discussed the complications of diabetes include retinopathy, neuropathy, nephropathy and cardiovascular disease.   Reviewed the A1C test, what the value reflects and the goal for the patient.   Reminded pt on A1C and blood sugar targets (Fasting < 130 and post prandial <180 ) and complications associated with hyperglycemia and uncontrolled DM (on AVS)   Recommended SMBG 2- 3 x daily if not using CGM   Reviewed s/s and treatment of hypoglycemia (on AVS)   At this time, no glucagon prescription.   Continue with lifestyle modifications since they have positive impact on diabetes/blood sugars/health (portion control, physical activity, weight loss)   Discussed the importance of SBGM and consistent, carb controlled carb diet as well as daily activity   Reinforced timing and adherence  with medication, self-monitoring of blood glucose and routine follow up    Recommended for  patient to follow up in Diabetes center to review carb goals, food label reading, and offer further support/guidance with exercise planning and weight loss.   Dexcom check in 3- 4 weeks   The patient is asked to return in 3 m  but recommended to contact DM clinic sooner if questions or concerns.    The patient indicates understanding of these issues and agrees to the plan.      Orders Placed This Encounter    GLUCOSE MANAGEMENT INDICATOR     This external order was created through the Results Console.     Order Specific Question:   Release to patient     Answer:   Immediate    LAN-65 Autoantibody     Standing Status:   Future     Standing Expiration Date:   3/3/2026    IA-2 Autoantibodies     Standing Status:   Future     Standing Expiration Date:   3/3/2026     Order Specific Question:   Release to patient     Answer:   Immediate    Islet Cell Cytoplasmic Antibody, IgG     Standing Status:   Future     Standing Expiration Date:   3/3/2026    Zinc Transporter 8 (ZnT8) Antibodies     Order Specific Question:   Release to patient     Answer:   Immediate    Comp Metabolic Panel (14)     Standing Status:   Future     Standing Expiration Date:   3/3/2026    Interpretation code 72 hour glucose monitor    Insulin Lispro-aabc, 1 U Dial, (LYUMJEV KWIKPEN) 100 UNIT/ML Subcutaneous Solution Pen-injector     Sig: As directed     Dispense:  3 mL     Refill:  0     LOT [id number] Ex 09/07/2025  1 box     Order Specific Question:   Lot Number?     Answer:   [id number]     Order Specific Question:   Expiration Date?     Answer:   9/7/2025     Order Specific Question:   Quantity     Answer:   3     Comments:   mL         Diabetes complications & risks surveillance:   A1C/Blood pressure: as reported    Last dilated eye exam: No data recorded Exam shows retinopathy? No data recorded  Last diabetic foot exam: Last Foot Exam: 02/05/25  Nephropathy  screening:    continue ace /arb rx.    Lab Results   Component Value Date    EGFRCR 58 (L) 02/28/2025    MICROALBCREA 11.6 02/28/2025     LIPID screening:    Lab Results   Component Value Date    CHOLEST 111 02/28/2025    LDL 55 02/28/2025    TRIG 94 02/28/2025    HDL 38 (L) 02/28/2025    FASTING Yes 02/28/2025    FASTING Yes 02/28/2025     Cholesterol Lowering Medications            rosuvastatin (CRESTOR) 20 MG Oral Tab             Spent 50 min obtaining patient history, evaluating patient, reviewing blood glucose trends, discussing treatment options, lifestyle modifications and completing documentation -this time does not including sensor interpretation time if applicable  The risks and benefits of my recommendations, as well as other treatment options were discussed with the patient today. questions were also answered to the best of my knowledge.       Note to patient: The 21 Century Cures Act makes medical notes like these available to patients in the interest of transparency. However, be advised this is a medical document. It is intended as peer to peer communication. It is written in medical language and may contain abbreviations or verbiage that are unfamiliar. It may appear blunt or direct. Medical documents are intended to carry relevant information, facts as evident, and the clinical opinion of the practitioner.

## 2025-03-03 ENCOUNTER — OFFICE VISIT (OUTPATIENT)
Facility: CLINIC | Age: 50
End: 2025-03-03
Payer: COMMERCIAL

## 2025-03-03 ENCOUNTER — TELEPHONE (OUTPATIENT)
Facility: CLINIC | Age: 50
End: 2025-03-03

## 2025-03-03 VITALS
DIASTOLIC BLOOD PRESSURE: 68 MMHG | BODY MASS INDEX: 38 KG/M2 | RESPIRATION RATE: 16 BRPM | WEIGHT: 197 LBS | OXYGEN SATURATION: 98 % | HEART RATE: 100 BPM | SYSTOLIC BLOOD PRESSURE: 122 MMHG

## 2025-03-03 DIAGNOSIS — E11.22 CKD STAGE 3 DUE TO TYPE 2 DIABETES MELLITUS (HCC): ICD-10-CM

## 2025-03-03 DIAGNOSIS — Z79.4 TYPE 2 DIABETES MELLITUS WITH HYPERGLYCEMIA, WITH LONG-TERM CURRENT USE OF INSULIN (HCC): Primary | ICD-10-CM

## 2025-03-03 DIAGNOSIS — E66.9 OBESITY (BMI 30-39.9): ICD-10-CM

## 2025-03-03 DIAGNOSIS — E11.65 TYPE 2 DIABETES MELLITUS WITH HYPERGLYCEMIA, WITH LONG-TERM CURRENT USE OF INSULIN (HCC): Primary | ICD-10-CM

## 2025-03-03 DIAGNOSIS — N18.30 CKD STAGE 3 DUE TO TYPE 2 DIABETES MELLITUS (HCC): ICD-10-CM

## 2025-03-03 DIAGNOSIS — E83.52 SERUM CALCIUM ELEVATED: ICD-10-CM

## 2025-03-03 LAB — AMB EXT GMI: 7.2 %

## 2025-03-03 RX ORDER — INSULIN LISPRO-AABC 100 [IU]/ML
INJECTION, SOLUTION SUBCUTANEOUS
Qty: 3 ML | Refills: 0 | COMMUNITY
Start: 2025-03-03

## 2025-03-03 NOTE — TELEPHONE ENCOUNTER
Called [facility] in [facility] for OV notes for latest diabetic eye exam     Unable to leave message will call tomorrow [phone]

## 2025-03-03 NOTE — PATIENT INSTRUCTIONS
A1C 8.0 %   A1C 7.2% on dexcom which is really good.     Continue   Dexcom G7   Jardiance 25mg once daily   Metformin 1000 mg once daily in AM   Soliqua solostar 18 units once daily     For soliqua: Must be taken within 1 hour of breakfast and dinner should be within 10 hours of the AM dose time since it wears off.         Start Humalog /Lyumjev insulin as rescue  Check BLOOD SUGAR before OR 2 hours after meal:     If blood sugar is 180- 240, take 3 units of insulin   If blood sugar is over 241 , take 4 units of insulin     This insulin lasts up to 3-4 hours after dosing     Please start using new injection sites for your insulin. Your skin is showing changes that you have been overusing these areas of the skin for your injections.   After using the same areas over and over, the skin can develop fat scarring from the preservatives in insulin.   This build up can interfere with how well the insulin you are giving will work at lowering blood sugars.                 The pancreas produces insulin in two different ways    Basal insulin (sometimes called background insulin) regulates glucose levels between meals and is released 24 hours a day, whether or not a person eats.    The role of basal insulin, also known as background insulin, is to keep blood glucose levels at consistent levels during periods of fasting. When fasting, the body steadily releases glucose into the blood to our cells supplied with energy.      Bolus ( or rapid acting) insulin is released by the pancreas in direct response to the ingestion of food in order to manage the rise in blood glucose that immediately follows. This is also called fast acting or meal time insulin       Your basal insulin is in the soliqua      This insulin should be taken around the same time every day. This insulin type lasts for up to 24 hours and  controls your blood sugar when you are fasting (not eating), you should always take this even if you are not eating. If you are  taking the correct dose then you should NOT have any overnight or early morning hypoglycemia (sugar less than 70-80). If you are having problems with hypoglycemia (glucose less than 70) frequently when not eating or overnight, you may need to lower your dose     Lyumjev/Lispro  is your fast acting insulin    Dose: see above     Jardiance: This medication will remove extra sugar out of blood into your urine. This medication will not only help improve your blood sugars but have also shown to help protect the kidneys and heart.       It can be dosed anytime of day but morning is probably best time to take it due to increase in urination effect.     Please drink at least 4 glasses of water daily to avoid dehydration       It does not cause low blood sugars (hypoglycemia) . If you develop any low blood sugars, we will need to adjust other medications    Please call me if you develop any symptoms of urinary tract infection (burning with urination) or yeast infection (new rash itching or burning in the genital area)      If you are ever sick and not keeping fluids down, please hold the   Also when taking these medications avoid following a  very-low-carbohydrate or ketogenic diet  as this  could increase the production of ketones, particularly during times of illness, dehydration, and/or metabolic stress, leading to diabetes ketoacidosis      Repeat labs in 3- 4 weeks to check on kidneys, calcium and T1 antibodies         American Diabetes Association: blood sugar targets:     Fasting blood sugar (before breakfast) Target:    (ideally less than 110)  2 hours after eating less than 180 (ideally less than  150 )     Call for blood sugars less than  75 or greater than  200 more than 2 times in a week     If you are wearing the sensor, please look at your trends/averages    Recommendations:   GMI (estimated A1C ) target under  7%     Time in range (healthy blood sugar targets) : goal is over 70%   less than 70 : goal is  less than 4%   Over 180 : goal is less than 20 %   Over 250: goal is  less than 5%     Watch for low blood sugars: (less than 70 )          Treatment of Low Blood Glucose Action Plan  1. Check blood glucose to be sure that it is low. You cannot  always go by symptoms or how you feel. If in doubt, treat your low blood glucose anyway.  Rule of 15 :     2. Take 15 grams of carbohydrate (carb). Here are some choices:    4 oz. regular fruit juice  3-4 glucose tablets  6 oz. regular soda   7-8 jelly beans    3. Recheck blood glucose after 10-15 minutes. If blood glucose is still low (less than 70 mg/dl) repeat the treatment (step 2).    4. If your next meal is more than one hour away, eat a small snack.    5. If you’re not sure what caused your low blood glucose, call your healthcare provider.    6. Always check your blood glucose before you drive       To treat a low, I recommend you carry with you easy, pre-portioned treatment for low blood sugars that are 15G of carbs:   - Children sized squeeze pouch applesauce (high fiber + carbs help prevent too high of a spike)  - Small children's sized juicebox- 15g carb --> 4oz juice box  - Glucose tablets from [employer], you can find them near diabetes supplies --> Note, you will need to eat 3-4 tablets to get to 15g of carbs  - Children sized fruit snack pack- look for one with 15 grams of total carbohydrate    AVOID complex carbs, or foods that contain fats along with carbs (like chocolate) can slow the absorption of glucose and should NOT be used to treat an emergency low

## 2025-03-03 NOTE — PROGRESS NOTES
-----------------------------  Dexcom Clarity  -----------------------------  Merna Melarajuanpablo    YOB: 1975    Generated at: Mon, Mar 3, 2025 1:08 PM CST    Reporting period: Sun Feb 2, 2025 - Mon Mar 3, 2025  -----------------------------  Glucose Details    Average glucose: 163 mg/dL    GMI: 7.2%    Standard deviation: 40 mg/dL    Coefficient of Variation: 24.7%  -----------------------------  Time in Range    Very High: 3%    High: 25%    In Range: 72%    Low: 0%    Very Low: 0%    Target Range   mg/dL    -----------------------------  Sensor usage    Days with data: 29/30    Time active: 94%    Avg. calibrations per day: 0.0

## 2025-03-03 NOTE — ASSESSMENT & PLAN NOTE
Reviewed with patient health impact associated with high glucose trends and the importance of better glucose control to prevent onset /progression of Diabetes complications such as   retinopathy, neuropathy, nephropathy and cardiovascular disease..

## 2025-03-03 NOTE — ASSESSMENT & PLAN NOTE
Repeat labs in 3-4 weeks   On ace prescription, SGLT2i   Discussed ozempic consideration given her kidney labs

## 2025-03-12 ENCOUNTER — PATIENT MESSAGE (OUTPATIENT)
Dept: ORTHOPEDICS CLINIC | Facility: CLINIC | Age: 50
End: 2025-03-12

## 2025-03-13 ENCOUNTER — TELEPHONE (OUTPATIENT)
Dept: ORTHOPEDICS CLINIC | Facility: CLINIC | Age: 50
End: 2025-03-13

## 2025-03-18 ENCOUNTER — TELEPHONE (OUTPATIENT)
Facility: CLINIC | Age: 50
End: 2025-03-18

## 2025-03-18 RX ORDER — ACYCLOVIR 400 MG/1
1 TABLET ORAL
Qty: 9 EACH | Refills: 1 | Status: SHIPPED | OUTPATIENT
Start: 2025-03-18

## 2025-03-18 NOTE — TELEPHONE ENCOUNTER
Order signed and processed started -patient called in stating [facility] informed [facility]     Sent message to [employer] to confirm if order needs to be sent to pharmacy instead. -        [facility]   Requested Prescriptions     Pending Prescriptions Disp Refills    Continuous Glucose Sensor (DEXCOM G7 SENSOR) Does not apply Misc 3 each 11     Si each Every 10 days. Use as directed every 10 days     Future Appointments   Date Time Provider Department Center   2025  9:00 AM [id number]   2025  9:00 AM River Gandara MD [facility] [address]   2025  4:30 PM Celsa De Paz, APRN [facility]OD2 [facility]

## 2025-03-18 NOTE — TELEPHONE ENCOUNTER
Order for G7 sensor sending to [facility]     Now on MDI -CB want to check on price for patient

## 2025-03-19 NOTE — TELEPHONE ENCOUNTER
[facility]ed [facility] to get Diabetic eye exam results faxed over.    Received results    Diabetic retinopathy detected in right eye     Abstracted and given to provider for review

## 2025-03-20 NOTE — TELEPHONE ENCOUNTER
Patient authorized visco to be scheduled:    DOS: 4/14/2025  PROVIDER: KATHIA  MEDICATION: YULIANAOLANE  OFFICE LOCATION: [facility]  PULLED: 3/20/2025  LABELED:3/20/2025  PLACED: 3/20/2025

## 2025-03-24 NOTE — TELEPHONE ENCOUNTER
Last time medication was refilled 09/17/2024  Last office visit  11/19/2024  Next office visit due/scheduled   Future Appointments   Date Time Provider Department Center   4/14/2025  9:00 AM Shital Caballero MD [id number]   4/22/2025  9:00 AM River Gandara MD [facility] [address] [address]   5/7/2025  4:30 PM Celsa De Paz, RAYNA ECPLPOD2 [facility]       Medication not on protocol.

## 2025-04-02 ENCOUNTER — LAB ENCOUNTER (OUTPATIENT)
Dept: LAB | Age: 50
End: 2025-04-02
Attending: NURSE PRACTITIONER
Payer: COMMERCIAL

## 2025-04-02 DIAGNOSIS — Z79.4 TYPE 2 DIABETES MELLITUS WITH HYPERGLYCEMIA, WITH LONG-TERM CURRENT USE OF INSULIN (HCC): ICD-10-CM

## 2025-04-02 DIAGNOSIS — E11.65 TYPE 2 DIABETES MELLITUS WITH HYPERGLYCEMIA, WITH LONG-TERM CURRENT USE OF INSULIN (HCC): ICD-10-CM

## 2025-04-02 LAB
ALBUMIN SERPL-MCNC: 4.7 G/DL (ref 3.2–4.8)
ALBUMIN/GLOB SERPL: 1.5 {RATIO} (ref 1–2)
ALP LIVER SERPL-CCNC: 88 U/L
ALT SERPL-CCNC: 13 U/L
ANION GAP SERPL CALC-SCNC: 2 MMOL/L (ref 0–18)
AST SERPL-CCNC: 17 U/L (ref ?–34)
BILIRUB SERPL-MCNC: 0.3 MG/DL (ref 0.3–1.2)
BUN BLD-MCNC: 28 MG/DL (ref 9–23)
CALCIUM BLD-MCNC: 10.9 MG/DL (ref 8.7–10.6)
CHLORIDE SERPL-SCNC: 108 MMOL/L (ref 98–112)
CO2 SERPL-SCNC: 24 MMOL/L (ref 21–32)
CREAT BLD-MCNC: 1.16 MG/DL
EGFRCR SERPLBLD CKD-EPI 2021: 57 ML/MIN/1.73M2 (ref 60–?)
FASTING STATUS PATIENT QL REPORTED: YES
GLOBULIN PLAS-MCNC: 3.1 G/DL (ref 2–3.5)
GLUCOSE BLD-MCNC: 136 MG/DL (ref 70–99)
OSMOLALITY SERPL CALC.SUM OF ELEC: 286 MOSM/KG (ref 275–295)
POTASSIUM SERPL-SCNC: 4.9 MMOL/L (ref 3.5–5.1)
PROT SERPL-MCNC: 7.8 G/DL (ref 5.7–8.2)
SODIUM SERPL-SCNC: 134 MMOL/L (ref 136–145)

## 2025-04-02 PROCEDURE — 86341 ISLET CELL ANTIBODY: CPT

## 2025-04-02 PROCEDURE — 36415 COLL VENOUS BLD VENIPUNCTURE: CPT | Performed by: NURSE PRACTITIONER

## 2025-04-02 PROCEDURE — 80053 COMPREHEN METABOLIC PANEL: CPT

## 2025-04-02 PROCEDURE — 86341 ISLET CELL ANTIBODY: CPT | Performed by: NURSE PRACTITIONER

## 2025-04-04 LAB
GAD-65: <5 U/ML
PANCREATIC ISLET CELLS: NEGATIVE

## 2025-04-10 ENCOUNTER — TELEPHONE (OUTPATIENT)
Facility: CLINIC | Age: 50
End: 2025-04-10

## 2025-04-10 ENCOUNTER — NURSE ONLY (OUTPATIENT)
Facility: CLINIC | Age: 50
End: 2025-04-10
Payer: COMMERCIAL

## 2025-04-10 DIAGNOSIS — Z79.4 TYPE 2 DIABETES MELLITUS WITH HYPERGLYCEMIA, WITH LONG-TERM CURRENT USE OF INSULIN (HCC): Primary | ICD-10-CM

## 2025-04-10 DIAGNOSIS — E11.65 TYPE 2 DIABETES MELLITUS WITH HYPERGLYCEMIA, WITH LONG-TERM CURRENT USE OF INSULIN (HCC): Primary | ICD-10-CM

## 2025-04-10 NOTE — TELEPHONE ENCOUNTER
Ok for sensor per APRN, instructed patient to call Dexcom for replacement.    Patient verbalized and understanding and stated she will call.

## 2025-04-11 LAB
IA-2 AUTOABS: <7.5 U/ML
ZNT8 ABS: <15 U/ML

## 2025-04-13 ENCOUNTER — PATIENT MESSAGE (OUTPATIENT)
Facility: CLINIC | Age: 50
End: 2025-04-13

## 2025-04-14 ENCOUNTER — OFFICE VISIT (OUTPATIENT)
Dept: ORTHOPEDICS CLINIC | Facility: CLINIC | Age: 50
End: 2025-04-14
Payer: COMMERCIAL

## 2025-04-14 DIAGNOSIS — M17.11 PRIMARY OSTEOARTHRITIS OF RIGHT KNEE: Primary | ICD-10-CM

## 2025-04-14 PROCEDURE — 88305 TISSUE EXAM BY PATHOLOGIST: CPT | Performed by: DERMATOLOGY

## 2025-04-14 RX ORDER — KETOROLAC TROMETHAMINE 30 MG/ML
30 INJECTION, SOLUTION INTRAMUSCULAR; INTRAVENOUS ONCE
Status: COMPLETED | OUTPATIENT
Start: 2025-04-14 | End: 2025-04-14

## 2025-04-14 RX ORDER — TRIAMCINOLONE ACETONIDE 40 MG/ML
40 INJECTION, SUSPENSION INTRA-ARTICULAR; INTRAMUSCULAR ONCE
Status: DISCONTINUED | OUTPATIENT
Start: 2025-04-14 | End: 2025-04-14

## 2025-04-14 RX ADMIN — KETOROLAC TROMETHAMINE 30 MG: 30 INJECTION, SOLUTION INTRAMUSCULAR; INTRAVENOUS at 07:10:00

## 2025-04-14 NOTE — PROCEDURES
Right Knee Intra-articular Injection    Name: Merna Caceres   MRN: [id number]  Date: 4/14/2025     Clinical Indications:   Knee Osteoarthritis with symptoms refractory to conservative measures.     After informed consent, the injection site was marked, sterilized with topical chlorhexidine antiseptic, and locally anesthetized with skin refrigerant.    The patient was situation in a comfortable position. Using sterile technique: a single Durolane 60mg/3mL (premixed syringe) was injected utilizing anterolateral approach with a 22 gauge needle.  A band-aid was applied.  The patient tolerated the procedure well.        Shital Caballero MD  Knee, Shoulder, & Elbow Surgery / Sports Medicine Specialist  Orthopaedic Surgery  [address] [place] [address].org  [email or web address]  t: [phone]  o: [phone]  f: [phone]

## 2025-04-14 NOTE — TELEPHONE ENCOUNTER
Last office visit 03/2025, discussed starting Ozempic.  Offer Ozempic sample pen, stop Soliqua/add basal?    Future Appointments   Date Time Provider Department Center   4/14/2025  9:00 AM [id number]   4/22/2025  9:00 AM River Gandara MD [facility] [address]   5/7/2025  4:30 PM Celsa De Paz, RAYNA ECPLPOD2 [facility]     Last A1c value was 8% done 2/28/2025.

## 2025-04-14 NOTE — TELEPHONE ENCOUNTER
Start Ozempic 0.25mg x 4 weeks then increase to 0.5mg Ozempic weekly     Ok to offer starter dose sample, Ozempic

## 2025-04-17 ENCOUNTER — NURSE ONLY (OUTPATIENT)
Facility: CLINIC | Age: 50
End: 2025-04-17
Payer: COMMERCIAL

## 2025-04-17 DIAGNOSIS — E11.8 DIABETES MELLITUS WITH COMPLICATION (HCC): ICD-10-CM

## 2025-04-17 DIAGNOSIS — E11.65 TYPE 2 DIABETES MELLITUS WITH HYPERGLYCEMIA, WITH LONG-TERM CURRENT USE OF INSULIN (HCC): Primary | ICD-10-CM

## 2025-04-17 DIAGNOSIS — Z79.4 TYPE 2 DIABETES MELLITUS WITH HYPERGLYCEMIA, WITH LONG-TERM CURRENT USE OF INSULIN (HCC): Primary | ICD-10-CM

## 2025-04-17 RX ORDER — PEN NEEDLE, DIABETIC 32GX 5/32"
NEEDLE, DISPOSABLE MISCELLANEOUS
Qty: 100 EACH | Refills: 2 | Status: SHIPPED | OUTPATIENT
Start: 2025-04-17

## 2025-04-17 RX ORDER — LISINOPRIL 5 MG/1
5 TABLET ORAL DAILY
Qty: 90 TABLET | Refills: 0 | Status: SHIPPED | OUTPATIENT
Start: 2025-04-17

## 2025-04-17 RX ORDER — SEMAGLUTIDE 0.68 MG/ML
0.5 INJECTION, SOLUTION SUBCUTANEOUS WEEKLY
Qty: 3 ML | Refills: 0 | COMMUNITY
Start: 2025-04-17

## 2025-04-17 NOTE — TELEPHONE ENCOUNTER
Last time medication was refilled 9/17/24  Last office visit  11/19/24  Next office visit due/scheduled   Future Appointments   Date Time Provider Department Center   4/22/2025  9:00 AM River Gandara MD [facility] [address]   5/7/2025  4:30 PM Celsa De Paz APRN ECPLPOD2 [facility]   .  Passed protocol, Medication sent.

## 2025-04-17 NOTE — TELEPHONE ENCOUNTER
Last time medication was refilled 8/22/23  Last office visit  11/19/24  Next office visit due/scheduled   Future Appointments   Date Time Provider Department Center   4/22/2025  9:00 AM River Gandara MD [facility] [address]   5/7/2025  4:30 PM Celsa De Paz APRN [id number]     Medication failed protocol

## 2025-04-21 PROBLEM — E83.52 SERUM CALCIUM ELEVATED: Status: RESOLVED | Noted: 2025-03-03 | Resolved: 2025-04-21

## 2025-04-21 PROBLEM — E11.65 TYPE 2 DIABETES MELLITUS WITH HYPERGLYCEMIA, WITH LONG-TERM CURRENT USE OF INSULIN (HCC): Status: RESOLVED | Noted: 2025-03-03 | Resolved: 2025-04-21

## 2025-04-21 PROBLEM — Z79.4 TYPE 2 DIABETES MELLITUS WITH HYPERGLYCEMIA, WITH LONG-TERM CURRENT USE OF INSULIN (HCC): Status: RESOLVED | Noted: 2025-03-03 | Resolved: 2025-04-21

## 2025-04-21 NOTE — PROGRESS NOTES
Subjective:   Patient ID: Merna Caceres is a 49 year old female.    HPI  HPI:   Merna Caceres is a 49 year old female who presents for a complete physical exam. Symptoms: denies discharge, itching, burning or dysuria. Patient is seeing endo for dm2 control. Last A1c = 8. No hypoglycemia  Denies cp or sob  No issues with med    PAST MEDICAL, SOCIAL, FAMILY HISTORIES REVIEWED WITH PT      Immunization History   Administered Date(s) Administered    >=3 YRS TRI  MULTIDOSE VIAL (23336) FLU CLINIC 10/09/2023    Covid-19 Vaccine Pfizer 30 mcg/0.3 ml 02/04/2021, 02/26/2021, 12/31/2021    FLU VAC QIV SPLIT 3 YRS AND OLDER (27605) 10/31/2017    FLULAVAL 6 months & older 0.5 ml Prefilled syringe (01385) 10/31/2022    FLUZONE 6 months and older PFS 0.5 ml (79241) 09/22/2018    Influenza 10/27/2016, 10/05/2021    Influenza Vaccine, trivalent (IIV3), PF 0.5mL (56499) 10/04/2024    Pneumococcal Conjugate PCV20 02/28/2023    Pneumovax 23 12/31/2014    TDAP 08/04/2009, 08/19/2019   Deferred Date(s) Deferred    Influenza 10/09/2013     Wt Readings from Last 6 Encounters:   04/22/25 190 lb (86.2 kg)   03/03/25 197 lb (89.4 kg)   11/19/24 194 lb 3.2 oz (88.1 kg)   10/29/24 189 lb 12.8 oz (86.1 kg)   09/17/24 188 lb (85.3 kg)   03/07/24 194 lb (88 kg)     Body mass index is 37.11 kg/m².     Lab Results   Component Value Date     (H) 04/02/2025     (H) 02/28/2025     (H) 03/08/2024    GLUCOSE 342 (H) 08/20/2014    GLUCOSE 295 (H) 02/13/2014    GLUCOSE 264 (H) 04/16/2013     Lab Results   Component Value Date    CHOLEST 111 02/28/2025    CHOLEST 116 03/08/2024    CHOLEST 121 05/17/2023     Lab Results   Component Value Date    HDL 38 (L) 02/28/2025    HDL 49 03/08/2024    HDL 47 05/17/2023     Lab Results   Component Value Date    LDL 55 02/28/2025    LDL 42 03/08/2024    LDL 44 05/17/2023     Lab Results   Component Value Date    AST 17 04/02/2025    AST 17 02/28/2025    AST 12 (L) 03/08/2024     Lab Results    Component Value Date    ALT 13 04/02/2025    ALT 15 02/28/2025    ALT 21 03/08/2024       Current Medications[1]   Past Medical History[2]   Past Surgical History[3]   Family History[4]   Social History:   Short Social Hx on File[5]  Occ: yes. : yes. Children: yes.   Exercise: once per week,  twice per week.  Diet: watches fats closely and watches sugar closely     REVIEW OF SYSTEMS:   GENERAL: feels well otherwise  A comprehensive 10 point review of systems was completed.     Pertinent positives and negatives noted in the HPI.      EXAM:   /80   Pulse 77   Temp 98.1 °F (36.7 °C)   Resp 16   Ht 5' (1.524 m)   Wt 190 lb (86.2 kg)   LMP 04/07/2025 (Exact Date)   SpO2 100%   BMI 37.11 kg/m²   Body mass index is 37.11 kg/m².   GENERAL: well developed, well nourished,in no apparent distress  SKIN: no rashes,no suspicious lesions  HEENT: atraumatic, normocephalic,ears and throat are clear  EYES:PERRLA, EOMI, normal optic disk,conjunctiva are clear  NECK: supple,no adenopathy,no bruits  LUNGS: clear to auscultation  CARDIO: RRR without murmur  GI: good BS's,no masses, HSM or tenderness  :deferred  MUSCULOSKELETAL: back is not tender  EXTREMITIES: no cyanosis, clubbing or edema  NEURO: Oriented times three,motor and sensory are grossly intact,Bilateral barefoot skin diabetic exam is normal, visualized feet and the appearance is normal.  Bilateral monofilament/sensation of both feet is normal.  Pulsation pedal pulse exam of both lower legs/feet is normal as well.        ASSESSMENT AND PLAN:   Merna Caceres is a 49 year old female who presents for a complete physical exam.  Pap and pelvic done by gyne    Order put in for mammogram     Health maintenance, will check fasting Lipids, CMP, and CBC.    UTD with screening colonoscopy.     Pt info handouts given for: exercise, low fat diet      Body mass index is 37.11 kg/m².,     recommended low fat diet and aerobic exercise 30 minutes three times  weekly.  The patient indicates understanding of these issues and agrees to the plan.  The patient is asked to return in 6 m    History/Other:   Review of Systems  Current Medications[6]  Allergies:Allergies[7]    Objective:   Physical Exam    Assessment & Plan:   1. Annual physical exam    2. Diabetes mellitus with complication (HCC)    3. Hyperlipidemia associated with type 2 diabetes mellitus (HCC)    4. Routine general medical examination at a health care facility    5. Encounter for screening mammogram for malignant neoplasm of breast        Orders Placed This Encounter   Procedures    CBC With Differential With Platelet    Hemoglobin A1C    TSH W Reflex To Free T4       Meds This Visit:  Requested Prescriptions      No prescriptions requested or ordered in this encounter       Imaging & Referrals:  OPHTHALMOLOGY - INTERNAL  JOANIE LUCIO 2D+3D SCREENING BILAT (CPT=77067/45754)         [1]   Current Outpatient Medications   Medication Sig Dispense Refill    lisinopril 5 MG Oral Tab Take 1 tablet (5 mg total) by mouth daily. 90 tablet 0    BD PEN NEEDLE ENRIQUE U/F 32G X 4 MM Does not apply Misc Use as directed 100 each 2    semaglutide (OZEMPIC, 0.25 OR 0.5 MG/DOSE,) 2 MG/3ML Subcutaneous Solution Pen-injector Inject 0.5 mg into the skin once a week. 3 mL 0    clobetasol 0.05 % External Cream Apply 1 Application topically 2 (two) times daily. Apply to rough raised areas on the legs bid until smooth 60 g 2    Mometasone Furoate 0.1 % External Cream Apply 1 Application topically 2 (two) times daily. Apply to ears twice daily as needed for itching 45 g 2    Continuous Glucose Sensor (DEXCOM G7 SENSOR) Does not apply Misc 1 each Every 10 days. Use as directed every 10 days 9 each 1    Insulin Lispro-aabc, 1 U Dial, (LYUMJEV KWJESSIKA) 100 UNIT/ML Subcutaneous Solution Pen-injector As directed 3 mL 0    rosuvastatin (CRESTOR) 20 MG Oral Tab Take 1 tablet (20 mg total) by mouth daily. 90 tablet 3    JARDIANCE 25 MG Oral Tab  Take 25 mg by mouth every morning before breakfast. 90 tablet 0    OneTouch Delica Lancets 33G Does not apply Misc 1 Lancet by Finger stick route As Directed.      ALPRAZolam 0.25 MG Oral Tab Take 1 tablet (0.25 mg total) by mouth nightly as needed for Sleep. 90 tablet 0    MetFORMIN HCl (GLUCOPHAGE) 1000 MG Oral Tab Take 1 tablet (1,000 mg total) by mouth daily with breakfast.     [2]   Past Medical History:   Anxiety state, unspecified    Arthritis    Decorative tattoo    Depressive disorder, not elsewhere classified    Diabetes mellitus (HCC)    Obesity, unspecified    Other and unspecified hyperlipidemia    Pap smear for cervical cancer screening    Negative/HPV Negative    Type II or unspecified type diabetes mellitus without mention of complication, not stated as uncontrolled    seeing Endo    Unspecified essential hypertension    Unspecified sleep apnea    Wears glasses   [3]   Past Surgical History:  Procedure Laterality Date    Hand/finger surgery unlisted Left 3/17/17--Dr. Anne    Thumb MP fusion and ligament reconstruction tendon interposition arthroplasty    Hand/finger surgery unlisted Right 20--Dr. Adair Anne    thumb ligament reconstruction tendon interposition arthroplasty    Knee surgery  2015   [4]   Family History  Problem Relation Age of Onset    Prostate Cancer Father     Heart Disorder Father         MI, stroke    Diabetes Father         unknown    Cancer Father         prostate    Diabetes Mother         unknown    Hypertension Mother         unknown    Lipids Mother         unknown    Cancer Maternal Grandfather         stomach    Diabetes Sister     Diabetes Brother    [5]   Social History  Socioeconomic History    Marital status:    Tobacco Use    Smoking status: Former     Current packs/day: 0.00     Types: Cigarettes     Quit date: 2020     Years since quittin.3    Smokeless tobacco: Former    Tobacco comments:     4 cigaretts per day for 10 yrs   Vaping  Use    Vaping status: Never Used   Substance and Sexual Activity    Alcohol use: Not Currently    Drug use: No    Sexual activity: Not Currently   Other Topics Concern    Caffeine Concern No    Stress Concern No    Weight Concern No    Special Diet No    Exercise No    Seat Belt Yes   [6]   Current Outpatient Medications   Medication Sig Dispense Refill    lisinopril 5 MG Oral Tab Take 1 tablet (5 mg total) by mouth daily. 90 tablet 0    BD PEN NEEDLE ENRIQUE U/F 32G X 4 MM Does not apply Misc Use as directed 100 each 2    semaglutide (OZEMPIC, 0.25 OR 0.5 MG/DOSE,) 2 MG/3ML Subcutaneous Solution Pen-injector Inject 0.5 mg into the skin once a week. 3 mL 0    clobetasol 0.05 % External Cream Apply 1 Application topically 2 (two) times daily. Apply to rough raised areas on the legs bid until smooth 60 g 2    Mometasone Furoate 0.1 % External Cream Apply 1 Application topically 2 (two) times daily. Apply to ears twice daily as needed for itching 45 g 2    Continuous Glucose Sensor (DEXCOM G7 SENSOR) Does not apply Misc 1 each Every 10 days. Use as directed every 10 days 9 each 1    Insulin Lispro-aabc, 1 U Dial, (LYUMJEV KWIKPEN) 100 UNIT/ML Subcutaneous Solution Pen-injector As directed 3 mL 0    rosuvastatin (CRESTOR) 20 MG Oral Tab Take 1 tablet (20 mg total) by mouth daily. 90 tablet 3    JARDIANCE 25 MG Oral Tab Take 25 mg by mouth every morning before breakfast. 90 tablet 0    OneTouch Delica Lancets 33G Does not apply Misc 1 Lancet by Finger stick route As Directed.      ALPRAZolam 0.25 MG Oral Tab Take 1 tablet (0.25 mg total) by mouth nightly as needed for Sleep. 90 tablet 0    MetFORMIN HCl (GLUCOPHAGE) 1000 MG Oral Tab Take 1 tablet (1,000 mg total) by mouth daily with breakfast.     [7] No Known Allergies

## 2025-04-22 ENCOUNTER — OFFICE VISIT (OUTPATIENT)
Dept: INTERNAL MEDICINE CLINIC | Facility: CLINIC | Age: 50
End: 2025-04-22
Payer: COMMERCIAL

## 2025-04-22 VITALS
RESPIRATION RATE: 16 BRPM | HEART RATE: 77 BPM | TEMPERATURE: 98 F | SYSTOLIC BLOOD PRESSURE: 124 MMHG | BODY MASS INDEX: 37.3 KG/M2 | HEIGHT: 60 IN | WEIGHT: 190 LBS | OXYGEN SATURATION: 100 % | DIASTOLIC BLOOD PRESSURE: 80 MMHG

## 2025-04-22 DIAGNOSIS — Z00.00 ROUTINE GENERAL MEDICAL EXAMINATION AT A HEALTH CARE FACILITY: ICD-10-CM

## 2025-04-22 DIAGNOSIS — Z00.00 ANNUAL PHYSICAL EXAM: Primary | ICD-10-CM

## 2025-04-22 DIAGNOSIS — Z12.31 ENCOUNTER FOR SCREENING MAMMOGRAM FOR MALIGNANT NEOPLASM OF BREAST: ICD-10-CM

## 2025-04-22 DIAGNOSIS — E11.69 HYPERLIPIDEMIA ASSOCIATED WITH TYPE 2 DIABETES MELLITUS (HCC): ICD-10-CM

## 2025-04-22 DIAGNOSIS — E78.5 HYPERLIPIDEMIA ASSOCIATED WITH TYPE 2 DIABETES MELLITUS (HCC): ICD-10-CM

## 2025-04-22 DIAGNOSIS — E11.8 DIABETES MELLITUS WITH COMPLICATION (HCC): ICD-10-CM

## 2025-04-22 PROCEDURE — 99396 PREV VISIT EST AGE 40-64: CPT | Performed by: INTERNAL MEDICINE

## 2025-04-23 ENCOUNTER — TELEPHONE (OUTPATIENT)
Dept: INTERNAL MEDICINE CLINIC | Facility: HOSPITAL | Age: 50
End: 2025-04-23

## 2025-04-23 DIAGNOSIS — Z20.822 SUSPECTED COVID-19 VIRUS INFECTION: Primary | ICD-10-CM

## 2025-04-24 ENCOUNTER — LAB ENCOUNTER (OUTPATIENT)
Dept: LAB | Facility: HOSPITAL | Age: 50
End: 2025-04-24
Attending: PREVENTIVE MEDICINE
Payer: COMMERCIAL

## 2025-04-24 DIAGNOSIS — Z20.822 SUSPECTED COVID-19 VIRUS INFECTION: ICD-10-CM

## 2025-04-24 LAB — SARS-COV-2 RNA RESP QL NAA+PROBE: NOT DETECTED

## 2025-04-25 ENCOUNTER — OFFICE VISIT (OUTPATIENT)
Dept: FAMILY MEDICINE CLINIC | Facility: CLINIC | Age: 50
End: 2025-04-25
Payer: COMMERCIAL

## 2025-04-25 VITALS
TEMPERATURE: 98 F | SYSTOLIC BLOOD PRESSURE: 120 MMHG | WEIGHT: 190 LBS | OXYGEN SATURATION: 96 % | HEART RATE: 88 BPM | HEIGHT: 60 IN | BODY MASS INDEX: 37.3 KG/M2 | RESPIRATION RATE: 18 BRPM | DIASTOLIC BLOOD PRESSURE: 76 MMHG

## 2025-04-25 DIAGNOSIS — J40 SINOBRONCHITIS: Primary | ICD-10-CM

## 2025-04-25 DIAGNOSIS — J32.9 SINOBRONCHITIS: Primary | ICD-10-CM

## 2025-04-25 PROCEDURE — 99213 OFFICE O/P EST LOW 20 MIN: CPT | Performed by: NURSE PRACTITIONER

## 2025-04-25 RX ORDER — ALBUTEROL SULFATE 90 UG/1
2 INHALANT RESPIRATORY (INHALATION) EVERY 4 HOURS PRN
Qty: 1 EACH | Refills: 0 | Status: SHIPPED | OUTPATIENT
Start: 2025-04-25 | End: 2025-05-09

## 2025-04-25 NOTE — PROGRESS NOTES
CHIEF COMPLAINT:     Chief Complaint   Patient presents with    Upper Respiratory Infection     X 3 days  Sx: fatigue, cough, congestion        HPI:   Merna Caceres is a 49 year old female who presents for cold symptoms for  3  days. Symptoms have progressed into sinus congestion and been worsening since onset. Sinus congestion/pain is described as a pressure and is located mainly in sinuses.  Patient also reports congestion, dry cough, wheezing, fatigue . reports dental pain. Has treated symptoms with OTC.  Negative home Covid.       Current Medications[1]   Past Medical History[2]   Past Surgical History[3]   Family History[4]   Short Social Hx on File[5]      REVIEW OF SYSTEMS:   GENERAL:  denies  diminished appetite  SKIN: no rashes or abnormal skin lesions  HEENT: See HPI.    LUNGS: denies shortness of breath or wheezing, See HPI  CARDIOVASCULAR: denies chest pain or palpitations   GI: denies N/V/C or abdominal pain  NEURO: + sinus headaches.  No numbness or tingling in face.    EXAM:   /76   Pulse 88   Temp 97.7 °F (36.5 °C)   Resp 18   Ht 5' (1.524 m)   Wt 190 lb (86.2 kg)   LMP 04/07/2025 (Exact Date)   SpO2 96%   BMI 37.11 kg/m²   GENERAL: well developed, well nourished,in no apparent distress  SKIN: no rashes,no suspicious lesions  HEAD: atraumatic, normocephalic, + tenderness on palpation of sinuses  EYES: conjunctiva clear, EOM intact  EARS: TM's without erythema, no bulging, no retraction, no fluid, bony landmarks visible  NOSE: nostrils patent, clear nasal mucous, nasal mucosa reddened and inflamed  THROAT: oral mucosa pink, moist. No visible dental caries. Posterior pharynx is not erythematous.  NECK: supple, non-tender  LUNGS: Breathing is non labored. + expiratory wheezing  CARDIO: RRR without murmur  EXTREMITIES: no cyanosis, clubbing or edema  LYMPH:  no cervical lymphadenopathy.        ASSESSMENT AND PLAN:   Merna Caceres is a 49 year old female who presents with URI symptoms  that are consistent with:      ASSESSMENT:  Encounter Diagnosis   Name Primary?    Sinobronchitis Yes         PLAN: Meds as below.  Comfort care instructions as listed in Patient Instructions    Meds & Refills for this Visit:  Requested Prescriptions     Signed Prescriptions Disp Refills    albuterol 108 (90 Base) MCG/ACT Inhalation Aero Soln 1 each 0     Sig: Inhale 2 puffs into the lungs every 4 (four) hours as needed for Wheezing or Shortness of Breath.       Risks, benefits, side effects of medication addressed and explained.    Patient Instructions   I recommend the 4 H's for inflammation:    1. Heat (warm mist from the shower or warm liquids such as tea)  2. Honey (mixed in your tea or by the spoonful [if you are not diabetic; over the age of 1 year]--take a spoonful 3 times a day and don't eat or drink anything for 15-20 minutes)  3. Humidity--cool mist in the bedroom at night  4. Hydration --at least 8 -10 glasses a day     Flonase daily  Follow up for new or worsening symptoms  Benadryl at night    The patient indicates understanding of these issues and agrees to the plan.  The patient is asked to f/u with PCP if sx's persist or worsen.           [1]   Current Outpatient Medications   Medication Sig Dispense Refill    albuterol 108 (90 Base) MCG/ACT Inhalation Aero Soln Inhale 2 puffs into the lungs every 4 (four) hours as needed for Wheezing or Shortness of Breath. 1 each 0    lisinopril 5 MG Oral Tab Take 1 tablet (5 mg total) by mouth daily. 90 tablet 0    BD PEN NEEDLE ENRIQUE U/F 32G X 4 MM Does not apply Misc Use as directed 100 each 2    semaglutide (OZEMPIC, 0.25 OR 0.5 MG/DOSE,) 2 MG/3ML Subcutaneous Solution Pen-injector Inject 0.5 mg into the skin once a week. 3 mL 0    clobetasol 0.05 % External Cream Apply 1 Application topically 2 (two) times daily. Apply to rough raised areas on the legs bid until smooth 60 g 2    Mometasone Furoate 0.1 % External Cream Apply 1 Application topically 2 (two)  times daily. Apply to ears twice daily as needed for itching 45 g 2    Continuous Glucose Sensor (DEXCOM G7 SENSOR) Does not apply Misc 1 each Every 10 days. Use as directed every 10 days 9 each 1    Insulin Lispro-aabc, 1 U Dial, (LYUMJEV KWIKPEN) 100 UNIT/ML Subcutaneous Solution Pen-injector As directed 3 mL 0    rosuvastatin (CRESTOR) 20 MG Oral Tab Take 1 tablet (20 mg total) by mouth daily. 90 tablet 3    JARDIANCE 25 MG Oral Tab Take 25 mg by mouth every morning before breakfast. 90 tablet 0    OneTouch Delica Lancets 33G Does not apply Misc 1 Lancet by Finger stick route As Directed.      ALPRAZolam 0.25 MG Oral Tab Take 1 tablet (0.25 mg total) by mouth nightly as needed for Sleep. 90 tablet 0    MetFORMIN HCl (GLUCOPHAGE) 1000 MG Oral Tab Take 1 tablet (1,000 mg total) by mouth daily with breakfast.     [2]   Past Medical History:   Anxiety state, unspecified    Arthritis    Decorative tattoo    Depressive disorder, not elsewhere classified    Diabetes mellitus (HCC)    Obesity, unspecified    Other and unspecified hyperlipidemia    Pap smear for cervical cancer screening    Negative/HPV Negative    Type II or unspecified type diabetes mellitus without mention of complication, not stated as uncontrolled    seeing Endo    Unspecified essential hypertension    Unspecified sleep apnea    Wears glasses   [3]   Past Surgical History:  Procedure Laterality Date    Hand/finger surgery unlisted Left 3/17/17--Dr. Anne    Thumb MP fusion and ligament reconstruction tendon interposition arthroplasty    Hand/finger surgery unlisted Right 12/30/20--Dr. Adair Anne    thumb ligament reconstruction tendon interposition arthroplasty    Knee surgery  02/20/2015   [4]   Family History  Problem Relation Age of Onset    Prostate Cancer Father     Heart Disorder Father         MI, stroke    Diabetes Father         unknown    Cancer Father         prostate    Diabetes Mother         unknown    Hypertension Mother          unknown    Lipids Mother         unknown    Cancer Maternal Grandfather         stomach    Diabetes Sister     Diabetes Brother    [5]   Social History  Socioeconomic History    Marital status:    Tobacco Use    Smoking status: Former     Current packs/day: 0.00     Types: Cigarettes     Quit date: 2020     Years since quittin.3    Smokeless tobacco: Former    Tobacco comments:     4 cigaretts per day for 10 yrs   Vaping Use    Vaping status: Never Used   Substance and Sexual Activity    Alcohol use: Not Currently    Drug use: No    Sexual activity: Not Currently   Other Topics Concern    Caffeine Concern No    Stress Concern No    Weight Concern No    Special Diet No    Exercise No    Seat Belt Yes

## 2025-04-25 NOTE — PATIENT INSTRUCTIONS
I recommend the 4 H's for inflammation:    1. Heat (warm mist from the shower or warm liquids such as tea)  2. Honey (mixed in your tea or by the spoonful [if you are not diabetic; over the age of 1 year]--take a spoonful 3 times a day and don't eat or drink anything for 15-20 minutes)  3. Humidity--cool mist in the bedroom at night  4. Hydration --at least 8 -10 glasses a day     Flonase daily  Follow up for new or worsening symptoms  Benadryl at night

## 2025-05-05 ENCOUNTER — PATIENT MESSAGE (OUTPATIENT)
Facility: CLINIC | Age: 50
End: 2025-05-05

## 2025-05-05 RX ORDER — SEMAGLUTIDE 0.68 MG/ML
0.5 INJECTION, SOLUTION SUBCUTANEOUS WEEKLY
Qty: 3 ML | Refills: 1 | Status: SHIPPED | OUTPATIENT
Start: 2025-05-05

## 2025-05-05 NOTE — TELEPHONE ENCOUNTER
Ok to resume next dose in 1 week   Will need to  prescription sooner since patient dosed 5 doses    Ozempic 0.25mg once weekly for next dose, then start 0.5mg subcutaneous once weeky

## 2025-05-05 NOTE — TELEPHONE ENCOUNTER
Patient came to discuss in person.  She believes she dosed 3 days in a row of Ozempic 0.25 mg, not 5.  She did experience nausea and vomiting and reports feeling tired over the weekend.  She is feeling better now.  Please advise when safe to dose again, or if she should wait more than one week.

## 2025-05-07 ENCOUNTER — OFFICE VISIT (OUTPATIENT)
Facility: LOCATION | Age: 50
End: 2025-05-07
Payer: COMMERCIAL

## 2025-05-07 DIAGNOSIS — B35.1 ONYCHOMYCOSIS: ICD-10-CM

## 2025-05-07 DIAGNOSIS — L60.3 NAIL DYSTROPHY: ICD-10-CM

## 2025-05-07 DIAGNOSIS — E11.8 DIABETES MELLITUS WITH COMPLICATION (HCC): Primary | ICD-10-CM

## 2025-05-07 PROCEDURE — 99213 OFFICE O/P EST LOW 20 MIN: CPT

## 2025-05-07 NOTE — PROGRESS NOTES
[facility]  Progress Note      Merna Caceres is a 49 year old female.   Chief Complaint   Patient presents with    Diabetic Foot Care     Nail trim and foot check f/u- last A1c= 8.0 on 2/28/25- LOV w/ PCP Dr. Gandara on 4/22/2025- FBS this qn=415         HPI:     History of Present Illness  Merna Caceres is a 49 year old female with diabetes who presents for a diabetic foot exam and nail trim.    She has a history of ingrown toenails that were previously excised and are currently asymptomatic, with no pain or issues since the procedure. Her nails continue to grow in a curved manner, but she has not experienced any discomfort.    She applies a moisturizer to her feet after showering, although it is not a specific urea cream.     She has not yet purchased new shoes and acknowledges that her current shoes may be narrow, contributing to the curvature of her toenails. She is interested in exploring different shoe brands that may offer better support and comfort.    No pain in the feet or toenails.     Past medical history, medications, allergies reviewed.       Allergies: Patient has no known allergies.   Current Medications[1]   Past Medical History[2]   Past Surgical History[3]   Family History[4]   Social Hx on file[5]        REVIEW OF SYSTEMS:     10 point ROS completed and was negative, except for pertinent positive and negatives stated in subjective.       EXAM:     GENERAL: well developed, well nourished, in no apparent distress  EXTREMITIES:  1. Integument: The patient's nails appear incurvated and elongated. Skin appears moist, warm, and supple with positive hair growth. There are no color changes. No open lesions. No macerations. No Hyperkeratotic lesions.   2. Vascular: Dorsalis pedis 2/4 bilateral and posterior tibial pulses 2/4 bilateral, capillary refill normal.  3. Neurological: Gross sensation intact via light touch bilaterally.  Normal sharp/dull sensation.   4. Musculoskeletal: All  muscle groups are graded 5/5 in the foot and ankle.       ASSESSMENT AND PLAN:   Diagnoses and all orders for this visit:    Diabetes mellitus with complication (HCC)    Nail dystrophy    Onychomycosis      Assessment & Plan    Patient was seen and evaluated today in clinic.  Chart history reviewed.    Diabetes mellitus   Diabetic foot exam normal except for inward-curving nails. Skin condition suboptimal.  - Moisturize feet post-shower, use urea cream if available.  - Advise proper footwear to prevent ingrown toenails, recommend Rosales or New Balance.  - Suggest OOFOS clogs for prolonged standing comfort.    At today's visit sharply debrided nails with a sterile nail nipper achieving a 20% reduction in thickness and length, without incident. Slant back procedure performed to ingrown nails. Nails further smoothed with emery board    Discussed importance of proper pedal hygiene, regular foot checks, and tight glucose control by following diet and medication regimen.       Educated patient on acute signs of infection. Advised patient to seek immediate medical attention if any concerns arise.  All of the patient's questions and concerns were addressed.  Patient indicated understanding of these issues and agrees to the plan.        Time spent reviewing pertinent information from patient's chart, reviewing any pertinent imaging, obtaining history and physical exam, discussing and mutually agreeing on a treatment plan, and documenting encounter: 20 minutes    RTC 3 months      RAYNA George        [facility]            Dragon speech recognition software was used to prepare this note.  Errors in word recognition may occur.  Please contact me with any questions/concerns with this note.        [1]   Current Outpatient Medications   Medication Sig Dispense Refill    semaglutide (OZEMPIC, 0.25 OR 0.5 MG/DOSE,) 2 MG/3ML Subcutaneous Solution Pen-injector Inject 0.5 mg into the skin once a week.  3 mL 1    albuterol 108 (90 Base) MCG/ACT Inhalation Aero Soln Inhale 2 puffs into the lungs every 4 (four) hours as needed for Wheezing or Shortness of Breath. 1 each 0    lisinopril 5 MG Oral Tab Take 1 tablet (5 mg total) by mouth daily. 90 tablet 0    BD PEN NEEDLE ENRIQUE U/F 32G X 4 MM Does not apply Misc Use as directed 100 each 2    clobetasol 0.05 % External Cream Apply 1 Application topically 2 (two) times daily. Apply to rough raised areas on the legs bid until smooth 60 g 2    Mometasone Furoate 0.1 % External Cream Apply 1 Application topically 2 (two) times daily. Apply to ears twice daily as needed for itching 45 g 2    Continuous Glucose Sensor (DEXCOM G7 SENSOR) Does not apply Misc 1 each Every 10 days. Use as directed every 10 days 9 each 1    Insulin Lispro-aabc, 1 U Dial, (KENNYUMRISHABH STEWARD) 100 UNIT/ML Subcutaneous Solution Pen-injector As directed 3 mL 0    rosuvastatin (CRESTOR) 20 MG Oral Tab Take 1 tablet (20 mg total) by mouth daily. 90 tablet 3    JARDIANCE 25 MG Oral Tab Take 25 mg by mouth every morning before breakfast. 90 tablet 0    OneTouch Delica Lancets 33G Does not apply Misc 1 Lancet by Finger stick route As Directed.      ALPRAZolam 0.25 MG Oral Tab Take 1 tablet (0.25 mg total) by mouth nightly as needed for Sleep. 90 tablet 0    MetFORMIN HCl (GLUCOPHAGE) 1000 MG Oral Tab Take 1 tablet (1,000 mg total) by mouth daily with breakfast.     [2]   Past Medical History:   Anxiety state, unspecified    Arthritis    Decorative tattoo    Depressive disorder, not elsewhere classified    Diabetes mellitus (HCC)    Obesity, unspecified    Other and unspecified hyperlipidemia    Pap smear for cervical cancer screening    Negative/HPV Negative    Type II or unspecified type diabetes mellitus without mention of complication, not stated as uncontrolled    seeing Endo    Unspecified essential hypertension    Unspecified sleep apnea    Wears glasses   [3]   Past Surgical History:  Procedure  Laterality Date    Hand/finger surgery unlisted Left 3/17/17--Dr. Anne    Thumb MP fusion and ligament reconstruction tendon interposition arthroplasty    Hand/finger surgery unlisted Right 20--Dr. Adair Anne    thumb ligament reconstruction tendon interposition arthroplasty    Knee surgery  2015   [4]   Family History  Problem Relation Age of Onset    Prostate Cancer Father     Heart Disorder Father         MI, stroke    Diabetes Father         unknown    Cancer Father         prostate    Diabetes Mother         unknown    Hypertension Mother         unknown    Lipids Mother         unknown    Cancer Maternal Grandfather         stomach    Diabetes Sister     Diabetes Brother    [5]   Social History  Socioeconomic History    Marital status:    Tobacco Use    Smoking status: Former     Current packs/day: 0.00     Types: Cigarettes     Quit date: 2020     Years since quittin.3    Smokeless tobacco: Former    Tobacco comments:     4 cigaretts per day for 10 yrs   Vaping Use    Vaping status: Never Used   Substance and Sexual Activity    Alcohol use: Not Currently    Drug use: No    Sexual activity: Not Currently   Other Topics Concern    Caffeine Concern No    Stress Concern No    Weight Concern No    Special Diet No    Exercise No    Seat Belt Yes

## 2025-05-07 NOTE — PROGRESS NOTES
The following individual(s) verbally consented to be recorded using ambient AI listening technology and understand that they can each withdraw their consent to this listening technology at any point by asking the clinician to turn off or pause the recording:    Patient name: Merna OMERO Caceres  Additional names:

## 2025-05-22 ENCOUNTER — HOSPITAL ENCOUNTER (OUTPATIENT)
Dept: MAMMOGRAPHY | Facility: HOSPITAL | Age: 50
Discharge: HOME | End: 2025-05-22
Attending: INTERNAL MEDICINE
Payer: COMMERCIAL

## 2025-05-22 DIAGNOSIS — Z12.31 ENCOUNTER FOR SCREENING MAMMOGRAM FOR MALIGNANT NEOPLASM OF BREAST: ICD-10-CM

## 2025-05-22 PROCEDURE — 77063 BREAST TOMOSYNTHESIS BI: CPT | Performed by: INTERNAL MEDICINE

## 2025-05-22 PROCEDURE — 77067 SCR MAMMO BI INCL CAD: CPT | Performed by: INTERNAL MEDICINE

## 2025-06-24 DIAGNOSIS — E11.8 DIABETES MELLITUS WITH COMPLICATION (HCC): ICD-10-CM

## 2025-06-24 RX ORDER — LISINOPRIL 5 MG/1
5 TABLET ORAL DAILY
Qty: 90 TABLET | Refills: 0 | Status: SHIPPED | OUTPATIENT
Start: 2025-06-24

## 2025-06-24 NOTE — TELEPHONE ENCOUNTER
Last time medication was refilled 4/17/25  Last office visit  4/22/25  Next office visit due/scheduled   Future Appointments   Date Time Provider Department Center   7/28/2025  1:00 PM Lorena Francisco APRN [facility]   8/6/2025  4:00 PM Celsa De Paz APRN [facility]   10/23/2025  4:30 PM River Gandara MD [facility] [address]       Passed protocol, Medication sent.

## 2025-07-15 RX ORDER — SEMAGLUTIDE 0.68 MG/ML
0.5 INJECTION, SOLUTION SUBCUTANEOUS WEEKLY
Qty: 3 ML | Refills: 1 | Status: SHIPPED | OUTPATIENT
Start: 2025-07-15

## 2025-07-15 NOTE — TELEPHONE ENCOUNTER
Requested Prescriptions     Pending Prescriptions Disp Refills    semaglutide (OZEMPIC, 0.25 OR 0.5 MG/DOSE,) 2 MG/3ML Subcutaneous Solution Pen-injector 3 mL 1     Sig: Inject 0.5 mg into the skin once a week.     Future Appointments   Date Time Provider Department Center   7/28/2025  1:00 PM Lorena Francisco APRN [facility]   8/6/2025  4:00 PM Celsa De Paz APRN [facility]   10/23/2025  4:30 PM River Gandara MD [facility] [address]   Last A1c value was 8% done 2/28/2025.  Refill 05/05/25 Emily   LOV 03/03/25 Emily

## 2025-07-27 NOTE — PROGRESS NOTES
Merna Caceres is a 49 year old presenting today  for diabetes management.   Primary care physician: River Gandara MD    Initial HPI consult in March 2025--> -> started ozempic (replacing soliqua)   Started 5/5/2-025, initially dosed first 3 doses daily for 3 consecutive days but then realized it was prescription for weekly           Most recent A1c value was 6.7% done 7/28/2025.  Today's A1C 6.7% ( last A1C 8.0% )       Tolerating Ozempic well; lost 20lbs since last Diabetes appointment   Notices decrease in appetite and has decreased her diet coke intake daily     Reviewed CGM report/trends w patient today:    Dexcom (full download in media)   Sensor active time: 94 %    2 week GMI 6.5  %    Average glucose: 134mg/dl     Hypoglycemia 0%    Time in Range 97  %  (ADA recommended goal > 70%)   Variability WNL ( < 33%)     Diabetes History:  Dx ~ 1990s   Patient has not had hospitalizations for blood sugar issues  denies any history of pancreatitis      Pre ozempic weight: 197 lb (3-2025) , A1C 9.3%)     Previous DM therapies:  Glyburide--> lack of control   Byetta --> change in therapy   Actos ---> change in therapy , lack of control   Soliqua ( change in therapy  4-2025)        Current DM Regimen:  Jardiance 25mg once daily   Metformin 1000 mg once daily in AM   Ozempic 0.5 mg subcutaneous once weekly       Lyumjev insulin --> sliding scale     Dose if Blood sugar is over 180 : before OR 2 hours after meal:     If blood sugar is 180- 240, take 3 units of insulin   If blood sugar is over 241 , take 4 units of insulin     HGBA1C:    Lab Results   Component Value Date    A1C 6.7 (A) 07/28/2025    A1C 8.0 (H) 02/28/2025    A1C 9.3 (H) 03/08/2024     (H) 02/28/2025       Lab Results   Component Value Date    CHOLEST 111 02/28/2025    CHOLEST 116 03/08/2024    TRIG 94 02/28/2025    TRIG 147 03/08/2024    HDL 38 (L) 02/28/2025    HDL 49 03/08/2024    LDL 55 02/28/2025    LDL 42 03/08/2024     Lab Results    Component Value Date    MICROALBCREA 11.6 2025    MICROALBCREA 63.5 (H) 2024      Lab Results   Component Value Date    CREATSERUM 1.16 (H) 2025    CREATSERUM 1.14 (H) 2025    EGFRCR 57 (L) 2025    EGFRCR 58 (L) 2025     Lab Results   Component Value Date    AST 17 2025    AST 17 2025    ALT 13 2025    ALT 15 2025       Lab Results   Component Value Date    TSH 1.620 2024    TSH 1.540 2023         DM Complications:  Microvascular:   Neuropathy: no  Retinopathy: no  Nephropathy: yes     Macrovascular:  PVD: no  CAD: no  Stroke/CVA: no        Modifying factors:  Medication adherence: yes   Barriers: no    Recent steroids, illness or infections ( past 3m): NO     Allergies: Patient has no known allergies.    Past Medical History:    Anxiety state, unspecified    Arthritis    Decorative tattoo    Depressive disorder, not elsewhere classified    Diabetes mellitus (HCC)    Obesity, unspecified    Other and unspecified hyperlipidemia    Pap smear for cervical cancer screening    Negative/HPV Negative    Type II or unspecified type diabetes mellitus without mention of complication, not stated as uncontrolled    seeing Endo    Unspecified essential hypertension    Unspecified sleep apnea    Wears glasses     Past Surgical History:   Procedure Laterality Date    Hand/finger surgery unlisted Left 3/17/17--Dr. Anne    Thumb MP fusion and ligament reconstruction tendon interposition arthroplasty    Hand/finger surgery unlisted Right 20--Dr. Adair Anne    thumb ligament reconstruction tendon interposition arthroplasty    Knee surgery  2015     Social History     Socioeconomic History    Marital status:    Tobacco Use    Smoking status: Former     Current packs/day: 0.00     Types: Cigarettes     Quit date: 2020     Years since quittin.5    Smokeless tobacco: Former    Tobacco comments:     4 cigaretts per day for 10 yrs    Vaping Use    Vaping status: Never Used   Substance and Sexual Activity    Alcohol use: Not Currently    Drug use: No    Sexual activity: Not Currently   Other Topics Concern    Caffeine Concern No    Stress Concern No    Weight Concern No    Special Diet No    Exercise No    Seat Belt Yes     Family History   Problem Relation Age of Onset    Prostate Cancer Father     Heart Disorder Father         MI, stroke    Diabetes Father         unknown    Cancer Father         prostate    Diabetes Mother         unknown    Hypertension Mother         unknown    Lipids Mother         unknown    Cancer Maternal Grandfather         stomach    Diabetes Sister     Diabetes Brother      Current Medication List:   Current Outpatient Medications   Medication Sig Dispense Refill    sertraline 50 MG Oral Tab Take 1 tablet (50 mg total) by mouth daily.      semaglutide 4 MG/3ML Subcutaneous Solution Pen-injector Inject 1 mg into the skin once a week. 9 mL 1    LISINOPRIL 5 MG Oral Tab Take 1 tablet (5 mg total) by mouth daily. 90 tablet 0    rosuvastatin (CRESTOR) 20 MG Oral Tab Take 1 tablet (20 mg total) by mouth daily. 90 tablet 3    JARDIANCE 25 MG Oral Tab Take 25 mg by mouth every morning before breakfast. 90 tablet 0    MetFORMIN HCl (GLUCOPHAGE) 1000 MG Oral Tab Take 1 tablet (1,000 mg total) by mouth daily with breakfast.      BD PEN NEEDLE ENRIQUE U/F 32G X 4 MM Does not apply Misc Use as directed 100 each 2    clobetasol 0.05 % External Cream Apply 1 Application topically 2 (two) times daily. Apply to rough raised areas on the legs bid until smooth 60 g 2    Mometasone Furoate 0.1 % External Cream Apply 1 Application topically 2 (two) times daily. Apply to ears twice daily as needed for itching 45 g 2    Continuous Glucose Sensor (DEXCOM G7 SENSOR) Does not apply Misc 1 each Every 10 days. Use as directed every 10 days 9 each 1    Insulin Lispro-aabc, 1 U Dial, (LYUMJEV KWIKPEN) 100 UNIT/ML Subcutaneous Solution Pen-injector  As directed 3 mL 0    OneTouch Delica Lancets 33G Does not apply Misc 1 Lancet by Finger stick route As Directed.      ALPRAZolam 0.25 MG Oral Tab Take 1 tablet (0.25 mg total) by mouth nightly as needed for Sleep. 90 tablet 0           DM associated review of  symptoms:   Endocrine: Polyuria, polyphagia, polydipsia: no  Neurological: Paresthesias: no  HEENT: Blurred vision: no  Skin: no rash or wounds  Hematological: Hypoglycemia: no      Review of Systems     LUNGS: denies shortness of breath   CARDIOVASCULAR: denies chest pain  GI: denies abdominal pain, nausea, constipation or diarrhea   : denies dysuria      Physical exam:  /66   Pulse 84   Resp 16   Wt 177 lb (80.3 kg)   LMP 07/14/2025 (Exact Date)   SpO2 98%   BMI 34.57 kg/m²   Body mass index is 34.57 kg/m².    Physical Exam   Vitals reviewed.  Constitutional: Normal appearance   Cardiovascular: Normal rate , rhythm   Pulmonary/Chest: Effort normal  Neurological: Alert and oriented .   Psychiatric: Normal mood and affect.     Assessment/Plan:    Hypertension  Well controlled but needs ongoing monitoring   CPM       Dyslipidemia   Cholesterol: 111, done on 2/28/2025.  HDL Cholesterol: 38, done on 2/28/2025.  TriGlycerides 94, done on 2/28/2025.  LDL Cholesterol: 55, done on 2/28/2025.      Needs ongoing monitoring   Continue atorvastatin prescription     Nephropathy /Chronic Kidney Disease   Reminded patient impact glycemic trends have on kidney health   Lab Results   Component Value Date    EGFRCR 57 (L) 04/02/2025    MICROALBCREA 11.6 02/28/2025    Update BMP ; ordered   Needs ongoing monitoring   Current meds  ace prescription, SGLT2i , Ozempic      2025 FDA label upate: Ozempic  reduces the risk of sustained egfr decline, ESRD, and CV death in adults with type 2 diabetes and chronic kidney disease         Type 2 diabetes mellitus with hyperglycemia, with long-term current use of insulin ([facility])  A1C: 6.7 (last 8.0 %)   Weight 177 ( last weight  197 lb)   Diabetes control is improving but needs ongoing management and evaluation  given the chronicity of Diabetes, ongoing medication monitoring and risk for complications     Increase Ozempic 0.5mg subcutaneous once weekly --> 1.0 mg subcutaneous once weekly       Continue   Dexcom G7   Jardiance 25mg once daily   Metformin 1000 mg once daily in AM           Lyumjev insulin --> sliding scale   Dose if Blood sugar is over 180 : before OR 2 hours after meal:     If blood sugar is 180- 240, take 3 units of insulin   If blood sugar is over 241 , take 4 units of insulin   Reviewed clinical significance of A1c, adverse effects of suboptimal glucose control, and goals of therapy   Discussed the importance of glycemic control to prevent complications of diabetes  We also discussed the complications of diabetes include retinopathy, neuropathy, nephropathy and cardiovascular disease.   Reviewed the A1C test, what the value reflects and the goal for the patient.   Reminded pt on A1C and blood sugar targets (Fasting < 130 and post prandial <180 ) and complications associated with hyperglycemia and uncontrolled DM (on AVS)   Recommended SMBG 2- 3 x daily if not using CGM   Reviewed s/s and treatment of hypoglycemia (on AVS)   At this time, no glucagon prescription.   Continue with lifestyle modifications since they have positive impact on diabetes/blood sugars/health (portion control, physical activity, weight loss)   Discussed the importance of SBGM and consistent, carb controlled carb diet as well as daily activity   Reinforced timing and adherence with medication, self-monitoring of blood glucose and routine follow up    Recommended for  patient to follow up in Diabetes center to review carb goals, food label reading, and offer further support/guidance with exercise planning and weight loss.   Referral for Diabetes Retina camera. Patient to schedule   The patient is asked to return in 5-6 m  but recommended to contact DM  clinic sooner if questions or concerns.    The patient indicates understanding of these issues and agrees to the plan.      Orders Placed This Encounter    POC Hemoglobin A1C     Release to patient:   Immediate    Basic Metabolic Panel (8)     Standing Status:   Future     Expected Date:   7/28/2025     Expiration Date:   7/28/2026    Diabetic Retinopathy Exam  OU - Both Eyes     Standing Status:   Future     Expected Date:   7/28/2025     Expiration Date:   7/28/2026    sertraline 50 MG Oral Tab     Sig: Take 1 tablet (50 mg total) by mouth daily.    semaglutide 4 MG/3ML Subcutaneous Solution Pen-injector     Sig: Inject 1 mg into the skin once a week.     Dispense:  9 mL     Refill:  1         Diabetes complications & risks surveillance:   A1C/Blood pressure: as reported    Last dilated eye exam: No data recorded Exam shows retinopathy? No data recorded  Last diabetic foot exam: Last Foot Exam: 02/05/25  Nephropathy screening:    continue ace /arb rx.    Lab Results   Component Value Date    EGFRCR 57 (L) 04/02/2025    MICROALBCREA 11.6 02/28/2025     LIPID screening:    Lab Results   Component Value Date    CHOLEST 111 02/28/2025    LDL 55 02/28/2025    TRIG 94 02/28/2025    HDL 38 (L) 02/28/2025    FASTING Yes 04/02/2025     Cholesterol Lowering Medications            rosuvastatin (CRESTOR) 20 MG Oral Tab                 Note to patient: The 21 Century Cures Act makes medical notes like these available to patients in the interest of transparency. However, be advised this is a medical document. It is intended as peer to peer communication. It is written in medical language and may contain abbreviations or verbiage that are unfamiliar. It may appear blunt or direct. Medical documents are intended to carry relevant information, facts as evident, and the clinical opinion of the practitioner.

## 2025-07-28 ENCOUNTER — OFFICE VISIT (OUTPATIENT)
Facility: CLINIC | Age: 50
End: 2025-07-28
Payer: COMMERCIAL

## 2025-07-28 VITALS
WEIGHT: 177 LBS | OXYGEN SATURATION: 98 % | SYSTOLIC BLOOD PRESSURE: 128 MMHG | RESPIRATION RATE: 16 BRPM | DIASTOLIC BLOOD PRESSURE: 66 MMHG | HEART RATE: 84 BPM | BODY MASS INDEX: 35 KG/M2

## 2025-07-28 DIAGNOSIS — E66.9 OBESITY (BMI 30-39.9): ICD-10-CM

## 2025-07-28 DIAGNOSIS — E11.69 HYPERLIPIDEMIA ASSOCIATED WITH TYPE 2 DIABETES MELLITUS (HCC): ICD-10-CM

## 2025-07-28 DIAGNOSIS — N18.30 CKD STAGE 3 DUE TO TYPE 2 DIABETES MELLITUS (HCC): ICD-10-CM

## 2025-07-28 DIAGNOSIS — E78.5 HYPERLIPIDEMIA ASSOCIATED WITH TYPE 2 DIABETES MELLITUS (HCC): ICD-10-CM

## 2025-07-28 DIAGNOSIS — E11.65 TYPE 2 DIABETES MELLITUS WITH HYPERGLYCEMIA, WITH LONG-TERM CURRENT USE OF INSULIN (HCC): Primary | ICD-10-CM

## 2025-07-28 DIAGNOSIS — Z79.4 TYPE 2 DIABETES MELLITUS WITH HYPERGLYCEMIA, WITH LONG-TERM CURRENT USE OF INSULIN (HCC): Primary | ICD-10-CM

## 2025-07-28 DIAGNOSIS — E11.22 CKD STAGE 3 DUE TO TYPE 2 DIABETES MELLITUS (HCC): ICD-10-CM

## 2025-07-28 LAB — HEMOGLOBIN A1C: 6.7 % (ref 4.3–5.6)

## 2025-07-28 NOTE — PATIENT INSTRUCTIONS
A1C 6.7% ( last A1C 8.0%)   Your weight has decreased 20 lbs     Update kidney labs in next month or so -     Increase ozempic 1.0mg subcutaneous once weekly w next refill       Continue   Dexcom G7   Jardiance 25mg once daily   Metformin 1000 mg once daily in AM         Lyummagdaleno insulin --> as needed       Dose if Blood sugar is over 180 : before OR 2 hours after meal:     If blood sugar is 180- 240, take 3 units of insulin   If blood sugar is over 241 , take 4 units of insulin     American Diabetes Association: blood sugar targets:     Fasting blood sugar (before breakfast) Target:    (ideally less than 110)  2 hours after eating less than 180 (ideally less than  150 )     Call for blood sugars less than  75 or greater than  200 more than 2 times in a week     If you are wearing the sensor, please look at your trends/averages    Recommendations:   GMI (estimated A1C ) target under  7%     Time in range (healthy blood sugar targets) : goal is over 70%   less than 70 : goal is less than 4%   Over 180 : goal is less than 20 %   Over 250: goal is  less than 5%     Watch for low blood sugars: (less than 70 )--> only if dosing insulin     Treatment of Low Blood Glucose Action Plan  1. Check blood glucose to be sure that it is low. You cannot  always go by symptoms or how you feel. If in doubt, treat your low blood glucose anyway.  Rule of 15 :     2. Take 15 grams of carbohydrate (carb). Here are some choices:    4 oz. regular fruit juice  3-4 glucose tablets  6 oz. regular soda   7-8 jelly beans    3. Recheck blood glucose after 10-15 minutes. If blood glucose is still low (less than 70 mg/dl) repeat the treatment (step 2).    4. If your next meal is more than one hour away, eat a small snack.    5. If you’re not sure what caused your low blood glucose, call your healthcare provider.    6. Always check your blood glucose before you drive       To treat a low, I recommend you carry with you easy, pre-portioned  treatment for low blood sugars that are 15G of carbs:   - Children sized squeeze pouch applesauce (high fiber + carbs help prevent too high of a spike)  - Small children's sized juicebox- 15g carb --> 4oz juice box  - Glucose tablets from [employer], you can find them near diabetes supplies --> Note, you will need to eat 3-4 tablets to get to 15g of carbs  - Children sized fruit snack pack- look for one with 15 grams of total carbohydrate    AVOID complex carbs, or foods that contain fats along with carbs (like chocolate) can slow the absorption of glucose and should NOT be used to treat an emergency low

## 2025-08-01 ENCOUNTER — LAB ENCOUNTER (OUTPATIENT)
Dept: LAB | Age: 50
End: 2025-08-01
Attending: NURSE PRACTITIONER

## 2025-08-01 DIAGNOSIS — E11.65 TYPE 2 DIABETES MELLITUS WITH HYPERGLYCEMIA, WITH LONG-TERM CURRENT USE OF INSULIN (HCC): ICD-10-CM

## 2025-08-01 DIAGNOSIS — Z00.00 ROUTINE GENERAL MEDICAL EXAMINATION AT A HEALTH CARE FACILITY: ICD-10-CM

## 2025-08-01 DIAGNOSIS — Z79.4 TYPE 2 DIABETES MELLITUS WITH HYPERGLYCEMIA, WITH LONG-TERM CURRENT USE OF INSULIN (HCC): ICD-10-CM

## 2025-08-01 LAB
ANION GAP SERPL CALC-SCNC: 6 MMOL/L (ref 0–18)
BASOPHILS # BLD AUTO: 0.03 X10(3) UL (ref 0–0.2)
BASOPHILS NFR BLD AUTO: 0.5 %
BUN BLD-MCNC: 19 MG/DL (ref 9–23)
CALCIUM BLD-MCNC: 11.5 MG/DL (ref 8.7–10.6)
CHLORIDE SERPL-SCNC: 107 MMOL/L (ref 98–112)
CO2 SERPL-SCNC: 26 MMOL/L (ref 21–32)
CREAT BLD-MCNC: 1.22 MG/DL (ref 0.55–1.02)
EGFRCR SERPLBLD CKD-EPI 2021: 54 ML/MIN/1.73M2 (ref 60–?)
EOSINOPHIL # BLD AUTO: 0.29 X10(3) UL (ref 0–0.7)
EOSINOPHIL NFR BLD AUTO: 4.5 %
ERYTHROCYTE [DISTWIDTH] IN BLOOD BY AUTOMATED COUNT: 14 %
EST. AVERAGE GLUCOSE BLD GHB EST-MCNC: 154 MG/DL (ref 68–126)
FASTING STATUS PATIENT QL REPORTED: YES
GLUCOSE BLD-MCNC: 149 MG/DL (ref 70–99)
HBA1C MFR BLD: 7 % (ref ?–5.7)
HCT VFR BLD AUTO: 35.7 % (ref 35–48)
HGB BLD-MCNC: 11.9 G/DL (ref 12–16)
IMM GRANULOCYTES # BLD AUTO: 0.01 X10(3) UL (ref 0–1)
IMM GRANULOCYTES NFR BLD: 0.2 %
LYMPHOCYTES # BLD AUTO: 1.69 X10(3) UL (ref 1–4)
LYMPHOCYTES NFR BLD AUTO: 26.3 %
MCH RBC QN AUTO: 28.8 PG (ref 26–34)
MCHC RBC AUTO-ENTMCNC: 33.3 G/DL (ref 31–37)
MCV RBC AUTO: 86.4 FL (ref 80–100)
MONOCYTES # BLD AUTO: 0.51 X10(3) UL (ref 0.1–1)
MONOCYTES NFR BLD AUTO: 7.9 %
NEUTROPHILS # BLD AUTO: 3.89 X10 (3) UL (ref 1.5–7.7)
NEUTROPHILS # BLD AUTO: 3.89 X10(3) UL (ref 1.5–7.7)
NEUTROPHILS NFR BLD AUTO: 60.6 %
OSMOLALITY SERPL CALC.SUM OF ELEC: 293 MOSM/KG (ref 275–295)
PLATELET # BLD AUTO: 271 10(3)UL (ref 150–450)
POTASSIUM SERPL-SCNC: 5.1 MMOL/L (ref 3.5–5.1)
RBC # BLD AUTO: 4.13 X10(6)UL (ref 3.8–5.3)
SODIUM SERPL-SCNC: 139 MMOL/L (ref 136–145)
TSI SER-ACNC: 2.07 UIU/ML (ref 0.55–4.78)
WBC # BLD AUTO: 6.4 X10(3) UL (ref 4–11)

## 2025-08-01 PROCEDURE — 36415 COLL VENOUS BLD VENIPUNCTURE: CPT

## 2025-08-01 PROCEDURE — 84443 ASSAY THYROID STIM HORMONE: CPT

## 2025-08-01 PROCEDURE — 80048 BASIC METABOLIC PNL TOTAL CA: CPT

## 2025-08-01 PROCEDURE — 83036 HEMOGLOBIN GLYCOSYLATED A1C: CPT

## 2025-08-01 PROCEDURE — 85025 COMPLETE CBC W/AUTO DIFF WBC: CPT

## 2025-08-06 ENCOUNTER — OFFICE VISIT (OUTPATIENT)
Facility: LOCATION | Age: 50
End: 2025-08-06

## 2025-08-06 DIAGNOSIS — E11.8 DIABETES MELLITUS WITH COMPLICATION (HCC): Primary | ICD-10-CM

## 2025-08-06 DIAGNOSIS — L60.0 ONYCHOCRYPTOSIS: ICD-10-CM

## 2025-08-06 DIAGNOSIS — L60.3 NAIL DYSTROPHY: ICD-10-CM

## 2025-08-06 PROCEDURE — 99213 OFFICE O/P EST LOW 20 MIN: CPT

## 2025-08-11 ENCOUNTER — PATIENT MESSAGE (OUTPATIENT)
Facility: CLINIC | Age: 50
End: 2025-08-11

## 2025-08-11 RX ORDER — ACYCLOVIR 400 MG/1
1 TABLET ORAL
Qty: 9 EACH | Refills: 1 | Status: SHIPPED | OUTPATIENT
Start: 2025-08-11

## 2025-08-12 ENCOUNTER — TELEPHONE (OUTPATIENT)
Dept: ENDOCRINOLOGY CLINIC | Facility: CLINIC | Age: 50
End: 2025-08-12

## (undated) NOTE — LETTER
Date: 4/25/2025    Patient Name: Merna Caceres          To Whom it may concern:    This letter has been written at the patient's request. The above patient was seen at [facility] for treatment of a medical condition..    The patient may return to work/school on 04/28/25 with the following limitations none.        Sincerely,      Radha Downey, NP